# Patient Record
Sex: FEMALE | Race: WHITE | NOT HISPANIC OR LATINO | Employment: FULL TIME | URBAN - METROPOLITAN AREA
[De-identification: names, ages, dates, MRNs, and addresses within clinical notes are randomized per-mention and may not be internally consistent; named-entity substitution may affect disease eponyms.]

---

## 2017-09-09 DIAGNOSIS — J45.21: ICD-10-CM

## 2017-09-11 NOTE — TELEPHONE ENCOUNTER
PROAIR HFA ORAL INH (200 PFS) 8.5G         Last Written Prescription Date: 12/8/09  Last Fill Quantity: 1, # refills: 1    Last Office Visit with G, P or [facility] prescribing provider:  9/8/16    Discontinued medication: start 12/8/09 stop 3/16/11     Future Office Visit:       Date of Last Asthma Action Plan Letter:   Asthma Action Plan Q1 Year    Topic Date Due     Asthma Action Plan - yearly  02/15/2014      Asthma Control Test:   ACT Total Scores 9/8/2016   ACT TOTAL SCORE -   ASTHMA ER VISITS -   ASTHMA HOSPITALIZATIONS -   ACT TOTAL SCORE (Goal Greater than or Equal to 20) 22   In the past 12 months, how many times did you visit the emergency room for your asthma without being admitted to the hospital? 0   In the past 12 months, how many times were you hospitalized overnight because of your asthma? 0       Date of Last Spirometry Test:   No results found for this or any previous visit.

## 2017-09-12 RX ORDER — ALBUTEROL SULFATE 90 UG/1
AEROSOL, METERED RESPIRATORY (INHALATION)
Qty: 1 INHALER | Refills: 0 | Status: SHIPPED | OUTPATIENT
Start: 2017-09-12 | End: 2017-09-25

## 2017-09-12 NOTE — TELEPHONE ENCOUNTER
Prescription approved per [facility] Refill Protocol.    Nelsy Ortiz RN  [facility]

## 2017-09-12 NOTE — TELEPHONE ENCOUNTER
Left VM for pt to return call to clinic    She needs appt to be seen and an ACT. Last OV was 9/8/16 and mention was made of her albuterol in plan    Charo Flores RN   [facility]

## 2017-09-13 ASSESSMENT — ASTHMA QUESTIONNAIRES: ACT_TOTALSCORE: 21

## 2017-09-22 NOTE — PATIENT INSTRUCTIONS
Switch to flonase and zyrtec  Return to clinic for any new or worsening symptoms or go to ER Urgent care in off hours      Preventive Health Recommendations  Female Ages 40 to 49    Yearly exam:     See your health care provider every year in order to  1. Review health changes.   2. Discuss preventive care.    3. Review your medicines if your doctor prescribed any.      Get a Pap test every three years (unless you have an abnormal result and your provider advises testing more often).      If you get Pap tests with HPV test, you only need to test every 5 years, unless you have an abnormal result. You do not need a Pap test if your uterus was removed (hysterectomy) and you have not had cancer.      You should be tested each year for STDs (sexually transmitted diseases), if you're at risk.       Ask your doctor if you should have a mammogram.      Have a colonoscopy (test for colon cancer) if someone in your family has had colon cancer or polyps before age 50.       Have a cholesterol test every 5 years.       Have a diabetes test (fasting glucose) after age 45. If you are at risk for diabetes, you should have this test every 3 years.    Shots: Get a flu shot each year. Get a tetanus shot every 10 years.     Nutrition:     Eat at least 5 servings of fruits and vegetables each day.    Eat whole-grain bread, whole-wheat pasta and brown rice instead of white grains and rice.    Talk to your provider about Calcium and Vitamin D.     Lifestyle    Exercise at least 150 minutes a week (an average of 30 minutes a day, 5 days a week). This will help you control your weight and prevent disease.    Limit alcohol to one drink per day.    No smoking.     Wear sunscreen to prevent skin cancer.    See your dentist every six months for an exam and cleaning.

## 2017-09-22 NOTE — PROGRESS NOTES
SUBJECTIVE:   CC: Noemi Chi is an 47 year old woman who presents for preventive health visit.     Healthy Habits:    Do you get at least three servings of calcium containing foods daily (dairy, green leafy vegetables, etc.)? yes    Amount of exercise or daily activities, outside of work: 4 day(s) per week    Problems taking medications regularly No    Medication side effects: No    Have you had an eye exam in the past two years? yes    Do you see a dentist twice per year? yes    Do you have sleep apnea, excessive snoring or daytime drowsiness?no        Asthma Follow-Up    Was ACT completed today?  No just did on 9/9/17    Respiratory symptoms:   Cough: Yes   Wheezing: No   Shortness of breath: Rarely     Use of short- acting(rescue) inhaler: Yes once every few days    Taking controlled (daily) meds as prescribed: Yes    ER/UC visits or hospital admissions since last visit: not for asthma, went to  for poison ivy on face    Recent asthma triggers that patient is dealing with: fall time weather       Reports she can feel her heart beating all of the time. She does admit she has some congestion most of the time. And usually she can hear it in her left ear. No irregular heartbeat      Today's PHQ-2 Score:   PHQ-2 ( 1999 [employer]) 9/25/2017 9/8/2016   Q1: Little interest or pleasure in doing things 0 0   Q2: Feeling down, depressed or hopeless 0 0   PHQ-2 Score 0 0         Abuse: Current or Past(Physical, Sexual or Emotional)- No  Do you feel safe in your environment - Yes    Social History   Substance Use Topics     Smoking status: Former Smoker     Years: 1.00     Quit date: 3/31/2005     Smokeless tobacco: Never Used     Alcohol use Yes      Comment: 6/ week            Reviewed orders with patient.  Reviewed health maintenance and updated orders accordingly - Yes  Labs reviewed in EPIC  BP Readings from Last 3 Encounters:   09/25/17 116/60   09/08/16 106/58   03/10/15 105/69    Wt Readings from Last 3  "Encounters:   09/25/17 172 lb 11.2 oz (78.3 kg)   09/08/16 176 lb 1.6 oz (79.9 kg)   03/10/15 164 lb 3.2 oz (74.5 kg)                      Mammogram not appropriate for this patient based on age.    Pertinent mammograms are reviewed under the imaging tab.  History of abnormal Pap smear: NO - age 30-65 PAP every 5 years with negative HPV co-testing recommended    Reviewed and updated as needed this visit by clinical staff  Tobacco  Allergies  Meds         Reviewed and updated as needed this visit by Provider              ROS:  C: NEGATIVE for fever, chills, change in weight  I: NEGATIVE for worrisome rashes, moles or lesions  E: NEGATIVE for vision changes or irritation  ENT: NEGATIVE for ear, mouth and throat problems  R: NEGATIVE for significant cough or SOB  B: NEGATIVE for masses, tenderness or discharge  CV: NEGATIVE for chest pain, palpitations or peripheral edema  GI: NEGATIVE for nausea, abdominal pain, heartburn, or change in bowel habits  : NEGATIVE for unusual urinary or vaginal symptoms. Periods are regular.  M: NEGATIVE for significant arthralgias or myalgia  N: NEGATIVE for weakness, dizziness or paresthesias  P: NEGATIVE for changes in mood or affect    OBJECTIVE:   /60  Pulse 72  Temp 98.4  F (36.9  C) (Oral)  Ht 5' 8\" (1.727 m)  Wt 172 lb 11.2 oz (78.3 kg)  SpO2 96%  BMI 26.26 kg/m2  EXAM:  GENERAL: healthy, alert and no distress  EYES: Eyes grossly normal to inspection, PERRL and conjunctivae and sclerae normal  HENT: ear canals normal, left TM with air fluid levels, right TM normal, nose and mouth without ulcers or lesions  NECK: no adenopathy, no asymmetry, masses, or scars and thyroid normal to palpation  RESP: lungs clear to auscultation - no rales, rhonchi or wheezes  CV: regular rate and rhythm, normal S1 S2, no S3 or S4, no murmur, click or rub, no peripheral edema and peripheral pulses strong  ABDOMEN: soft, nontender, no hepatosplenomegaly, no masses and bowel sounds " normal  MS: no gross musculoskeletal defects noted, no edema  SKIN: no suspicious lesions or rashes  NEURO: Normal strength and tone, mentation intact and speech normal  PSYCH: mentation appears normal, affect normal/bright    ASSESSMENT/PLAN:       ICD-10-CM    1. Encounter for routine adult medical exam with abnormal findings Z00.01    2. Intrinsic asthma with acute exacerbation, mild intermittent J45.21 albuterol (PROAIR HFA) 108 (90 BASE) MCG/ACT Inhaler   3. Dysfunction of eustachian tube, left H69.82      Patient Instructions   Switch to flonase and zyrtec  Return to clinic for any new or worsening symptoms or go to ER Urgent care in off hours      Preventive Health Recommendations  Female Ages 40 to 49    Yearly exam:     See your health care provider every year in order to  1. Review health changes.   2. Discuss preventive care.    3. Review your medicines if your doctor prescribed any.      Get a Pap test every three years (unless you have an abnormal result and your provider advises testing more often).      If you get Pap tests with HPV test, you only need to test every 5 years, unless you have an abnormal result. You do not need a Pap test if your uterus was removed (hysterectomy) and you have not had cancer.      You should be tested each year for STDs (sexually transmitted diseases), if you're at risk.       Ask your doctor if you should have a mammogram.      Have a colonoscopy (test for colon cancer) if someone in your family has had colon cancer or polyps before age 50.       Have a cholesterol test every 5 years.       Have a diabetes test (fasting glucose) after age 45. If you are at risk for diabetes, you should have this test every 3 years.    Shots: Get a flu shot each year. Get a tetanus shot every 10 years.     Nutrition:     Eat at least 5 servings of fruits and vegetables each day.    Eat whole-grain bread, whole-wheat pasta and brown rice instead of white grains and rice.    Talk to your  "provider about Calcium and Vitamin D.     Lifestyle    Exercise at least 150 minutes a week (an average of 30 minutes a day, 5 days a week). This will help you control your weight and prevent disease.    Limit alcohol to one drink per day.    No smoking.     Wear sunscreen to prevent skin cancer.    See your dentist every six months for an exam and cleaning.      COUNSELING:   Reviewed preventive health counseling, as reflected in patient instructions         reports that she quit smoking about 12 years ago. She quit after 1.00 year of use. She has never used smokeless tobacco.    Estimated body mass index is 26.26 kg/(m^2) as calculated from the following:    Height as of this encounter: 5' 8\" (1.727 m).    Weight as of this encounter: 172 lb 11.2 oz (78.3 kg).       Counseling Resources:  ATP IV Guidelines  Pooled Cohorts Equation Calculator  Breast Cancer Risk Calculator  FRAX Risk Assessment  ICSI Preventive Guidelines  Dietary Guidelines for Americans, 2010  USDA's MyPlate  ASA Prophylaxis  Lung CA Screening    Gianna Cabello PA-C  [facility]  "

## 2017-09-25 ENCOUNTER — OFFICE VISIT (OUTPATIENT)
Dept: FAMILY MEDICINE | Facility: CLINIC | Age: 47
End: 2017-09-25
Payer: COMMERCIAL

## 2017-09-25 VITALS
DIASTOLIC BLOOD PRESSURE: 60 MMHG | WEIGHT: 172.7 LBS | TEMPERATURE: 98.4 F | HEART RATE: 72 BPM | HEIGHT: 68 IN | BODY MASS INDEX: 26.18 KG/M2 | OXYGEN SATURATION: 96 % | SYSTOLIC BLOOD PRESSURE: 116 MMHG

## 2017-09-25 DIAGNOSIS — H69.92 DYSFUNCTION OF EUSTACHIAN TUBE, LEFT: ICD-10-CM

## 2017-09-25 DIAGNOSIS — J45.21: ICD-10-CM

## 2017-09-25 DIAGNOSIS — Z00.01 ENCOUNTER FOR ROUTINE ADULT MEDICAL EXAM WITH ABNORMAL FINDINGS: Primary | ICD-10-CM

## 2017-09-25 PROCEDURE — 99396 PREV VISIT EST AGE 40-64: CPT | Performed by: PHYSICIAN ASSISTANT

## 2017-09-25 RX ORDER — ALBUTEROL SULFATE 90 UG/1
2 AEROSOL, METERED RESPIRATORY (INHALATION) EVERY 4 HOURS PRN
Qty: 2 INHALER | Refills: 3 | Status: SHIPPED | OUTPATIENT
Start: 2017-09-25 | End: 2019-01-07

## 2017-09-25 NOTE — NURSING NOTE
"Chief Complaint   Patient presents with     Physical       Initial /60  Pulse 72  Temp 98.4  F (36.9  C) (Oral)  Ht 5' 8\" (1.727 m)  Wt 172 lb 11.2 oz (78.3 kg)  SpO2 96%  BMI 26.26 kg/m2 Estimated body mass index is 26.26 kg/(m^2) as calculated from the following:    Height as of this encounter: 5' 8\" (1.727 m).    Weight as of this encounter: 172 lb 11.2 oz (78.3 kg).    Vida Linton MA      "

## 2017-09-25 NOTE — LETTER
My Asthma Action Plan  Name: Noemi Chi   YOB: 1970  Date: 9/22/2017   My doctor: Gianna Cabello PA-C   My clinic: [facility]        My Control Medicine: None  My Rescue Medicine: Albuterol (Proair/Ventolin/Proventil) inhaler 1-2 puffs by mouth every 4-6 hours   My Asthma Severity: intermittent  Avoid your asthma triggers: triggers               GREEN ZONE   Good Control    I feel good    No cough or wheeze    Can work, sleep and play without asthma symptoms       Take your asthma control medicine every day.     1. If exercise triggers your asthma, take your rescue medication    15 minutes before exercise or sports, and    During exercise if you have asthma symptoms  2. Spacer to use with inhaler: If you have a spacer, make sure to use it with your inhaler             YELLOW ZONE Getting Worse  I have ANY of these:    I do not feel good    Cough or wheeze    Chest feels tight    Wake up at night   1. Keep taking your Green Zone medications  2. Start taking your rescue medicine:    every 20 minutes for up to 1 hour. Then every 4 hours for 24-48 hours.  3. If you stay in the Yellow Zone for more than 12-24 hours, contact your doctor.  4. If you do not return to the Green Zone in 12-24 hours or you get worse, start taking your oral steroid medicine if prescribed by your provider.           RED ZONE Medical Alert - Get Help  I have ANY of these:    I feel awful    Medicine is not helping    Breathing getting harder    Trouble walking or talking    Nose opens wide to breathe       1. Take your rescue medicine NOW  2. If your provider has prescribed an oral steroid medicine, start taking it NOW  3. Call your doctor NOW  4. If you are still in the Red Zone after 20 minutes and you have not reached your doctor:    Take your rescue medicine again and    Call 911 or go to the emergency room right away    See your regular doctor within 2 weeks of an Emergency Room or Urgent  Care visit for follow-up treatment.        Electronically signed by: Vida Linton, September 22, 2017    Annual Reminders:  Meet with Asthma Educator,  Flu Shot in the Fall, consider Pneumonia Vaccination for patients with asthma (aged 19 and older).    [facility] [id number] [address] AT [address]                    Asthma Triggers  How To Control Things That Make Your Asthma Worse    Triggers are things that make your asthma worse.  Look at the list below to help you find your triggers and what you can do about them.  You can help prevent asthma flare-ups by staying away from your triggers.      Trigger                                                          What you can do   Cigarette Smoke  Tobacco smoke can make asthma worse. Do not allow smoking in your home, car or around you.  Be sure no one smokes at a child s day care or school.  If you smoke, ask your health care provider for ways to help you quit.  Ask family members to quit too.  Ask your health care provider for a referral to Quit Plan to help you quit smoking, or call 9-036-635-PLAN.     Colds, Flu, Bronchitis  These are common triggers of asthma. Wash your hands often.  Don t touch your eyes, nose or mouth.  Get a flu shot every year.     Dust Mites  These are tiny bugs that live in cloth or carpet. They are too small to see. Wash sheets and blankets in hot water every week.   Encase pillows and mattress in dust mite proof covers.  Avoid having carpet if you can. If you have carpet, vacuum weekly.   Use a dust mask and HEPA vacuum.   Pollen and Outdoor Mold  Some people are allergic to trees, grass, or weed pollen, or molds. Try to keep your windows closed.  Limit time out doors when pollen count is high.   Ask you health care provider about taking medicine during allergy season.     Animal Dander  Some people are allergic to skin flakes, urine or saliva from pets with fur or feathers. Keep pets with  fur or feathers out of your home.    If you can t keep the pet outdoors, then keep the pet out of your bedroom.  Keep the bedroom door closed.  Keep pets off cloth furniture and away from stuffed toys.     Mice, Rats, and Cockroaches  Some people are allergic to the waste from these pests.   Cover food and garbage.  Clean up spills and food crumbs.  Store grease in the refrigerator.   Keep food out of the bedroom.   Indoor Mold  This can be a trigger if your home has high moisture. Fix leaking faucets, pipes, or other sources of water.   Clean moldy surfaces.  Dehumidify basement if it is damp and smelly.   Smoke, Strong Odors, and Sprays  These can reduce air quality. Stay away from strong odors and sprays, such as perfume, powder, hair spray, paints, smoke incense, paint, cleaning products, candles and new carpet.   Exercise or Sports  Some people with asthma have this trigger. Be active!  Ask your doctor about taking medicine before sports or exercise to prevent symptoms.    Warm up for 5-10 minutes before and after sports or exercise.     Other Triggers of Asthma  Cold air:  Cover your nose and mouth with a scarf.  Sometimes laughing or crying can be a trigger.  Some medicines and food can trigger asthma.

## 2017-09-25 NOTE — MR AVS SNAPSHOT
After Visit Summary   9/25/2017    Noemi Chi    MRN: [id number]           Patient Information     Date Of Birth          1970        Visit Information        Provider Department      9/25/2017 8:40 AM Gianna Cabello PA-C [facility]        Today's Diagnoses     Mild intermittent asthma    -  1    Routine general medical examination at a health care facility        Encounter for routine adult medical exam with abnormal findings        Intrinsic asthma with acute exacerbation, mild intermittent          Care Instructions    Switch to flonase and zyrtec  Return to clinic for any new or worsening symptoms or go to ER Urgent care in off hours      Preventive Health Recommendations  Female Ages 40 to 49    Yearly exam:     See your health care provider every year in order to  1. Review health changes.   2. Discuss preventive care.    3. Review your medicines if your doctor prescribed any.      Get a Pap test every three years (unless you have an abnormal result and your provider advises testing more often).      If you get Pap tests with HPV test, you only need to test every 5 years, unless you have an abnormal result. You do not need a Pap test if your uterus was removed (hysterectomy) and you have not had cancer.      You should be tested each year for STDs (sexually transmitted diseases), if you're at risk.       Ask your doctor if you should have a mammogram.      Have a colonoscopy (test for colon cancer) if someone in your family has had colon cancer or polyps before age 50.       Have a cholesterol test every 5 years.       Have a diabetes test (fasting glucose) after age 45. If you are at risk for diabetes, you should have this test every 3 years.    Shots: Get a flu shot each year. Get a tetanus shot every 10 years.     Nutrition:     Eat at least 5 servings of fruits and vegetables each day.    Eat whole-grain bread, whole-wheat pasta and brown rice instead  "of white grains and rice.    Talk to your provider about Calcium and Vitamin D.     Lifestyle    Exercise at least 150 minutes a week (an average of 30 minutes a day, 5 days a week). This will help you control your weight and prevent disease.    Limit alcohol to one drink per day.    No smoking.     Wear sunscreen to prevent skin cancer.    See your dentist every six months for an exam and cleaning.          Follow-ups after your visit        Who to contact     If you have questions or need follow up information about today's clinic visit or your schedule please contact [facility] directly at [phone].  Normal or non-critical lab and imaging results will be communicated to you by [employer], letter or phone within 4 business days after the clinic has received the results. If you do not hear from us within 7 days, please contact the clinic through [employer] or phone. If you have a critical or abnormal lab result, we will notify you by phone as soon as possible.  Submit refill requests through [employer] or call your pharmacy and they will forward the refill request to us. Please allow 3 business days for your refill to be completed.          Additional Information About Your Visit        MyChart Information     [employer] gives you secure access to your electronic health record. If you see a primary care provider, you can also send messages to your care team and make appointments. If you have questions, please call your primary care clinic.  If you do not have a primary care provider, please call [phone] and they will assist you.        Care EveryWhere ID     This is your Care EveryWhere ID. This could be used by other organizations to access your [facility] medical records  [id number]        Your Vitals Were     Pulse Temperature Height Pulse Oximetry BMI (Body Mass Index)       72 98.4  F (36.9  C) (Oral) 5' 8\" (1.727 m) 96% 26.26 kg/m2        Blood Pressure from Last 3 Encounters:   09/25/17 116/60 "   09/08/16 106/58   03/10/15 105/69    Weight from Last 3 Encounters:   09/25/17 172 lb 11.2 oz (78.3 kg)   09/08/16 176 lb 1.6 oz (79.9 kg)   03/10/15 164 lb 3.2 oz (74.5 kg)              We Performed the Following     Asthma Action Plan (AAP)          Today's Medication Changes          These changes are accurate as of: 9/25/17  8:51 AM.  If you have any questions, ask your nurse or doctor.               These medicines have changed or have updated prescriptions.        Dose/Directions    albuterol 108 (90 BASE) MCG/ACT Inhaler   Commonly known as:  PROAIR HFA   This may have changed:  See the new instructions.   Used for:  Intrinsic asthma with acute exacerbation, mild intermittent   Changed by:  Gianna Cabello PA-C        Dose:  2 puff   Inhale 2 puffs into the lungs every 4 hours as needed for shortness of breath / dyspnea or wheezing   Quantity:  2 Inhaler   Refills:  3            Where to get your medicines      These medications were sent to [facility] [address] [place] [address] AT [address] [address]     Phone:  [phone]     albuterol 108 (90 BASE) MCG/ACT Inhaler                Primary Care Provider Office Phone # Fax #    [facility] [place] [phone] [address] [address]        Equal Access to Services     VASILIY CHAPMAN : Hadii orlin goddardo Soeleazar, waaxda luqadaha, qaybta kaalmada adeegyada, paulina martin. So [facility] [phone].    ATENCIÓN: Si habla español, tiene a ha disposición servicios gratuitos de asistencia lingüística. Llame al [phone].    We comply with applicable federal civil rights laws and [place] laws. We do not discriminate on the basis of race, color, national origin, age, disability sex, sexual orientation or gender identity.            Thank you!     Thank you for choosing [facility]  for your care.  Our goal is always to provide you with excellent care. Hearing back from our patients is one way we can continue to improve our services. Please take a few minutes to complete the written survey that you may receive in the mail after your visit with us. Thank you!             Your Updated Medication List - Protect others around you: Learn how to safely use, store and throw away your medicines at www.disposemymeds.org.          This list is accurate as of: 9/25/17  8:51 AM.  Always use your most recent med list.                   Brand Name Dispense Instructions for use Diagnosis    albuterol 108 (90 BASE) MCG/ACT Inhaler    PROAIR HFA    2 Inhaler    Inhale 2 puffs into the lungs every 4 hours as needed for shortness of breath / dyspnea or wheezing    Intrinsic asthma with acute exacerbation, mild intermittent       CLARITIN 10 MG capsule   Generic drug:  loratadine      Take 10 mg by mouth as needed.    Chronic rhinitis       MULTIVITAMINS PO      Take  by mouth.    Intrinsic asthma with acute exacerbation

## 2019-01-06 ASSESSMENT — ENCOUNTER SYMPTOMS
ARTHRALGIAS: 1
HEMATOCHEZIA: 0
FEVER: 0
HEMATURIA: 0
BREAST MASS: 0
EYE PAIN: 0
COUGH: 0
WEAKNESS: 1
HEADACHES: 1
ABDOMINAL PAIN: 0
SORE THROAT: 0
NERVOUS/ANXIOUS: 1
PALPITATIONS: 1
CONSTIPATION: 0
DIZZINESS: 0
DIARRHEA: 0
PARESTHESIAS: 0
DYSURIA: 0
FREQUENCY: 0
NAUSEA: 0
SHORTNESS OF BREATH: 0
MYALGIAS: 1
JOINT SWELLING: 0
CHILLS: 0
HEARTBURN: 0

## 2019-01-07 ENCOUNTER — OFFICE VISIT (OUTPATIENT)
Dept: FAMILY MEDICINE | Facility: CLINIC | Age: 49
End: 2019-01-07
Payer: COMMERCIAL

## 2019-01-07 VITALS
HEART RATE: 66 BPM | WEIGHT: 181.5 LBS | BODY MASS INDEX: 27.51 KG/M2 | HEIGHT: 68 IN | DIASTOLIC BLOOD PRESSURE: 71 MMHG | OXYGEN SATURATION: 95 % | TEMPERATURE: 98 F | SYSTOLIC BLOOD PRESSURE: 109 MMHG

## 2019-01-07 DIAGNOSIS — Z11.4 SCREENING FOR HIV (HUMAN IMMUNODEFICIENCY VIRUS): ICD-10-CM

## 2019-01-07 DIAGNOSIS — Z00.00 ROUTINE GENERAL MEDICAL EXAMINATION AT A HEALTH CARE FACILITY: Primary | ICD-10-CM

## 2019-01-07 DIAGNOSIS — J45.20 MILD INTERMITTENT ASTHMA, UNSPECIFIED WHETHER COMPLICATED: ICD-10-CM

## 2019-01-07 DIAGNOSIS — M77.11 RIGHT TENNIS ELBOW: ICD-10-CM

## 2019-01-07 DIAGNOSIS — J45.21: ICD-10-CM

## 2019-01-07 DIAGNOSIS — Z13.220 SCREENING CHOLESTEROL LEVEL: ICD-10-CM

## 2019-01-07 DIAGNOSIS — M77.8 LEFT ELBOW TENDONITIS: ICD-10-CM

## 2019-01-07 LAB
ALBUMIN SERPL-MCNC: 3.7 G/DL (ref 3.4–5)
ALP SERPL-CCNC: 71 U/L (ref 40–150)
ALT SERPL W P-5'-P-CCNC: 22 U/L (ref 0–50)
ANION GAP SERPL CALCULATED.3IONS-SCNC: 7 MMOL/L (ref 3–14)
AST SERPL W P-5'-P-CCNC: 14 U/L (ref 0–45)
BILIRUB SERPL-MCNC: 0.4 MG/DL (ref 0.2–1.3)
BUN SERPL-MCNC: 13 MG/DL (ref 7–30)
CALCIUM SERPL-MCNC: 8.7 MG/DL (ref 8.5–10.1)
CHLORIDE SERPL-SCNC: 107 MMOL/L (ref 94–109)
CHOLEST SERPL-MCNC: 216 MG/DL
CO2 SERPL-SCNC: 25 MMOL/L (ref 20–32)
CREAT SERPL-MCNC: 0.67 MG/DL (ref 0.52–1.04)
GFR SERPL CREATININE-BSD FRML MDRD: >90 ML/MIN/{1.73_M2}
GLUCOSE SERPL-MCNC: 98 MG/DL (ref 70–99)
HDLC SERPL-MCNC: 78 MG/DL
LDLC SERPL CALC-MCNC: 123 MG/DL
NONHDLC SERPL-MCNC: 138 MG/DL
POTASSIUM SERPL-SCNC: 4.4 MMOL/L (ref 3.4–5.3)
PROT SERPL-MCNC: 6.9 G/DL (ref 6.8–8.8)
SODIUM SERPL-SCNC: 139 MMOL/L (ref 133–144)
TRIGL SERPL-MCNC: 77 MG/DL

## 2019-01-07 PROCEDURE — 99213 OFFICE O/P EST LOW 20 MIN: CPT | Mod: 25 | Performed by: NURSE PRACTITIONER

## 2019-01-07 PROCEDURE — 36415 COLL VENOUS BLD VENIPUNCTURE: CPT | Performed by: NURSE PRACTITIONER

## 2019-01-07 PROCEDURE — 99396 PREV VISIT EST AGE 40-64: CPT | Performed by: NURSE PRACTITIONER

## 2019-01-07 PROCEDURE — 80053 COMPREHEN METABOLIC PANEL: CPT | Performed by: NURSE PRACTITIONER

## 2019-01-07 PROCEDURE — 80061 LIPID PANEL: CPT | Performed by: NURSE PRACTITIONER

## 2019-01-07 RX ORDER — ALBUTEROL SULFATE 90 UG/1
2 AEROSOL, METERED RESPIRATORY (INHALATION) EVERY 4 HOURS PRN
Qty: 2 INHALER | Refills: 3 | Status: SHIPPED | OUTPATIENT
Start: 2019-01-07 | End: 2020-01-07

## 2019-01-07 ASSESSMENT — ENCOUNTER SYMPTOMS
SHORTNESS OF BREATH: 0
CHILLS: 0
FEVER: 0
PALPITATIONS: 1
COUGH: 0
WEAKNESS: 1
HEADACHES: 1
ABDOMINAL PAIN: 0
DIARRHEA: 0
JOINT SWELLING: 0
MYALGIAS: 1
PARESTHESIAS: 0
SORE THROAT: 0
DYSURIA: 0
BREAST MASS: 0
CONSTIPATION: 0
HEARTBURN: 0
FREQUENCY: 0
HEMATOCHEZIA: 0
ARTHRALGIAS: 1
EYE PAIN: 0
NERVOUS/ANXIOUS: 1
NAUSEA: 0
HEMATURIA: 0
DIZZINESS: 0

## 2019-01-07 ASSESSMENT — MIFFLIN-ST. JEOR: SCORE: 1510.75

## 2019-01-09 ASSESSMENT — ASTHMA QUESTIONNAIRES: ACT_TOTALSCORE: 23

## 2019-01-09 NOTE — PROGRESS NOTES
[facility]   Clinic Visit s Jan 14, 2019    PCP: Maye Franklin Xiomy Franklin is a 48 year old female who is seen in consultation at the request of Dr. Bennett for   Chief Complaint   Patient presents with     Left Elbow - Pain     Right Elbow - Pain       Injury: Reports insidious onset without acute precipitating event. The patient does do yoga and cross country skiing which may have contributed.      Right hand dominant    Location of Pain: right elbow lateral, radiating to hand   Duration of Pain: 3 week(s)  Rating of Pain at worst: 7/10  Rating of Pain Currently: 7/10  Pain is better with: activity avoidance   Pain is worse with: carrying (trash, groceries, laundry), gripping and self care  Treatment so far consists of: massage and ibuprofen  Associated symptoms: no distal numbness or tingling; denies swelling or warmth  Recent imaging completed: No recent imaging completed.  Prior History of related problems: none    Had ergonomic assessment at work. Does some computer work at home.     Location of Pain: right shoulder, superior    Duration of Pain: 3 month(s)  Rating of Pain at worst: 3/10  Rating of Pain Currently: 3/10  Pain is better with: activity avoidance   Pain is worse with: overhead motion, lifting, swimming  Treatment so far consists of: massage and ibuprofen  Associated symptoms: popping sensations, no distal numbness or tingling; denies swelling or warmth  Recent imaging completed: No recent imaging completed.  Prior History of related problems: similar issues many years ago, went away with activity avoidance   Not much pain now after backing off of yoga, popping continues, would like to get back to yoga and swimming.    Social History: is employed as a/an property management      Past Medical History:   Diagnosis Date     Mild intermittent asthma     EIA     Varicella without mention of complication        Patient Active Problem List    Diagnosis Date Noted  "    Chronic rhinitis 2011     Priority: Medium     CARDIOVASCULAR SCREENING; LDL GOAL LESS THAN 160 10/31/2010     Priority: Medium     Miscarriage 2009     Priority: Medium     Mild intermittent asthma 2005     Priority: Medium     EIA         Family History   Problem Relation Age of Onset     Hypertension Maternal Grandmother      Hypertension Maternal Grandfather      Hypertension Mother      C.A.D. Maternal Grandmother         anureysm/ ruptured aortic     C.A.D. Father         bypass surgery     Gynecology Mother      Heart Disease Father      Lipids Mother        Social History     Socioeconomic History     Marital status: Single     Spouse name: Not on file     Number of children: 0     Years of education: 16     Highest education level: Not on file   Social Needs     Financial resource strain: Not on file     Food insecurity - worry: Not on file     Food insecurity - inability: Not on file     Transportation needs - medical: Not on file     Transportation needs - non-medical: Not on file   Occupational History     Occupation: property manger     Employer: [employer]     Comment: [employer]   Tobacco Use     Smoking status: Former Smoker     Years: 1.00     Last attempt to quit: 3/31/2005     Years since quittin.7     Smokeless tobacco: Never Used   Substance and Sexual Activity     Alcohol use: Yes     Comment: 6/ week     Drug use: No       Past Surgical History:   Procedure Laterality Date     HC TOOTH EXTRACTION W/FORCEP      [place] teeth           Review of Systems   Musculoskeletal: Positive for joint pain. Negative for neck pain.   All other systems reviewed and are negative.        Physical Exam  /77   Ht 1.734 m (5' 8.25\")   Wt 82.1 kg (181 lb)   LMP 2016 (Approximate)   BMI 27.32 kg/m    Constitutional:well-developed, well-nourished, and in no distress.   Cardiovascular: Intact distal pulses.    Neurological: alert. Gait Normal:   " Gait, station, stance, and balance appear normal for age  Skin: Skin is warm and dry.   Psychiatric: Mood and affect normal.   Respiratory: unlabored, speaks in full sentences  Lymph: no LAD, no lymphangitis      Right Elbow Exam     Tenderness   The patient is experiencing tenderness in the lateral epicondyle.     Range of Motion   Extension: normal   Flexion: normal   Pronation: normal   Supination: normal     Muscle Strength   The patient has normal right elbow strength.    Tests   Varus: negative  Valgus: negative    Other   Erythema: absent  Scars: absent  Sensation: normal  Pulse: present    Comments:  Posterior pain at full extension.  Pain with  and resisted wrist extension, full strength      Left Elbow Exam     Tenderness   The patient is experiencing tenderness in the lateral epicondyle.     Range of Motion   Extension: normal   Flexion: normal   Pronation: normal   Supination: normal     Muscle Strength   The patient has normal left elbow strength.    Tests   Varus: negative  Valgus: negative    Other   Erythema: absent  Scars: absent  Sensation: normal  Pulse: present    Comments:  Pain with  and resisted wrist extension, full strength      Right Shoulder Exam     Tenderness   The patient is experiencing tenderness in the acromioclavicular joint.    Range of Motion   Active abduction: normal   Passive abduction: normal   Extension: normal   External rotation: normal   Forward flexion: normal   Internal rotation 0 degrees: normal   Internal rotation 90 degrees: normal     Muscle Strength   Abduction: 5/5   Internal rotation: 5/5   External rotation: 5/5   Supraspinatus: 5/5   Subscapularis: 5/5   Biceps: 5/5     Tests   Apprehension: negative  Jade test: negative  Cross arm: positive  Impingement: positive  Drop arm: negative  Sulcus: absent    Other   Erythema: absent  Scars: absent  Sensation: none  Pulse: present          ASSESSMENT/PLAN    ICD-10-CM    1. Injury of right acromioclavicular  "joint, initial encounter S49.91[facility], HAND, AND CHIROPRACTIC REFERRAL   2. Lateral epicondylitis of both elbows M77.11     M77.12      Nature of injury discussed noting degenerative nature of these injuries as opposed to inflammatory conditions.  Treatment options reviewed including continued rest, therapy either home program or formal hand therapy referral, and injection of cortisone or irritant therapy protocols.    Noted that PRP and autologous blood injections are considered investigational and not covered by health plans. NG patches can be helpful but may take several months to be effective. Cortisone injections are commonly done, and can be effective though are at risk for relapse, but pain control often valuable to many patients.     Since left tennis elbow is getting better on its own, she opts to proceed with home exercises for her right elbow.  Declines hand therapy referral or cortisone steroid injection . Instead opts for home rehab program. Instructed in eccentric exercises, a \"longer and stronger\" concept, which is useful in building a stronger and more functional muscle and tendon unit. This can be performed with free weights, a peggy twist device, or Therabands. Individualized rehab program designed for patient during visit.     Right shoulder is primarily symptomatic at the AC joint.  X-rays discussed but as this would not affect treatment options, we opted to defer.  Instead recommended physical therapy for rotator cuff strengthening and shoulder stabilization.  Cold packs and avoidance of heavy straps across her shoulder on the right side recommended.  Follow-up as needed.  "

## 2019-01-14 ENCOUNTER — OFFICE VISIT (OUTPATIENT)
Dept: ORTHOPEDICS | Facility: CLINIC | Age: 49
End: 2019-01-14
Payer: COMMERCIAL

## 2019-01-14 VITALS
DIASTOLIC BLOOD PRESSURE: 77 MMHG | SYSTOLIC BLOOD PRESSURE: 115 MMHG | HEIGHT: 68 IN | BODY MASS INDEX: 27.43 KG/M2 | WEIGHT: 181 LBS

## 2019-01-14 DIAGNOSIS — S49.91XA INJURY OF RIGHT ACROMIOCLAVICULAR JOINT, INITIAL ENCOUNTER: Primary | ICD-10-CM

## 2019-01-14 DIAGNOSIS — M77.11 LATERAL EPICONDYLITIS OF BOTH ELBOWS: ICD-10-CM

## 2019-01-14 DIAGNOSIS — M77.12 LATERAL EPICONDYLITIS OF BOTH ELBOWS: ICD-10-CM

## 2019-01-14 PROCEDURE — 99244 OFF/OP CNSLTJ NEW/EST MOD 40: CPT | Performed by: FAMILY MEDICINE

## 2019-01-14 ASSESSMENT — ENCOUNTER SYMPTOMS: NECK PAIN: 0

## 2019-01-14 ASSESSMENT — MIFFLIN-ST. JEOR: SCORE: 1508.48

## 2019-01-14 NOTE — LETTER
1/14/2019         RE: Noemi Chi  [address]  [place] [address]        Dear Colleague,    Thank you for referring your patient, Noemi Chi, to the  SPORTS MEDICINE. Please see a copy of my visit note below.    [facility]   Clinic Visit s Jan 14, 2019    PCP: Maye Franklin Xiomy Franklin is a 48 year old female who is seen in consultation at the request of Dr. Bennett for   Chief Complaint   Patient presents with     Left Elbow - Pain     Right Elbow - Pain       Injury: Reports insidious onset without acute precipitating event. The patient does do yoga and cross country skiing which may have contributed.      Right hand dominant    Location of Pain: right elbow lateral, radiating to hand   Duration of Pain: 3 week(s)  Rating of Pain at worst: 7/10  Rating of Pain Currently: 7/10  Pain is better with: activity avoidance   Pain is worse with: carrying (trash, groceries, laundry), gripping and self care  Treatment so far consists of: massage and ibuprofen  Associated symptoms: no distal numbness or tingling; denies swelling or warmth  Recent imaging completed: No recent imaging completed.  Prior History of related problems: none    Had ergonomic assessment at work. Does some computer work at home.     Location of Pain: right shoulder, superior    Duration of Pain: 3 month(s)  Rating of Pain at worst: 3/10  Rating of Pain Currently: 3/10  Pain is better with: activity avoidance   Pain is worse with: overhead motion, lifting, swimming  Treatment so far consists of: massage and ibuprofen  Associated symptoms: popping sensations, no distal numbness or tingling; denies swelling or warmth  Recent imaging completed: No recent imaging completed.  Prior History of related problems: similar issues many years ago, went away with activity avoidance   Not much pain now after backing off of yoga, popping continues, would like to get back to yoga and  swimming.    Social History: is employed as a/an property management      Past Medical History:   Diagnosis Date     Mild intermittent asthma     EIA     Varicella without mention of complication        Patient Active Problem List    Diagnosis Date Noted     Chronic rhinitis 2011     Priority: Medium     CARDIOVASCULAR SCREENING; LDL GOAL LESS THAN 160 10/31/2010     Priority: Medium     Miscarriage 2009     Priority: Medium     Mild intermittent asthma 2005     Priority: Medium     EIA         Family History   Problem Relation Age of Onset     Hypertension Maternal Grandmother      Hypertension Maternal Grandfather      Hypertension Mother      C.A.D. Maternal Grandmother         anureysm/ ruptured aortic     C.A.D. Father         bypass surgery     Gynecology Mother      Heart Disease Father      Lipids Mother        Social History     Socioeconomic History     Marital status: Single     Spouse name: Not on file     Number of children: 0     Years of education: 16     Highest education level: Not on file   Social Needs     Financial resource strain: Not on file     Food insecurity - worry: Not on file     Food insecurity - inability: Not on file     Transportation needs - medical: Not on file     Transportation needs - non-medical: Not on file   Occupational History     Occupation: property manger     Employer: [employer]     Comment: [employer]   Tobacco Use     Smoking status: Former Smoker     Years: 1.00     Last attempt to quit: 3/31/2005     Years since quittin.7     Smokeless tobacco: Never Used   Substance and Sexual Activity     Alcohol use: Yes     Comment: 6/ week     Drug use: No       Past Surgical History:   Procedure Laterality Date     HC TOOTH EXTRACTION W/FORCEP      [place] teeth           Review of Systems   Musculoskeletal: Positive for joint pain. Negative for neck pain.   All other systems reviewed and are negative.        Physical  "Exam  /77   Ht 1.734 m (5' 8.25\")   Wt 82.1 kg (181 lb)   LMP 08/08/2016 (Approximate)   BMI 27.32 kg/m     Constitutional:well-developed, well-nourished, and in no distress.   Cardiovascular: Intact distal pulses.    Neurological: alert. Gait Normal:   Gait, station, stance, and balance appear normal for age  Skin: Skin is warm and dry.   Psychiatric: Mood and affect normal.   Respiratory: unlabored, speaks in full sentences  Lymph: no LAD, no lymphangitis      Right Elbow Exam     Tenderness   The patient is experiencing tenderness in the lateral epicondyle.     Range of Motion   Extension: normal   Flexion: normal   Pronation: normal   Supination: normal     Muscle Strength   The patient has normal right elbow strength.    Tests   Varus: negative  Valgus: negative    Other   Erythema: absent  Scars: absent  Sensation: normal  Pulse: present    Comments:  Posterior pain at full extension.  Pain with  and resisted wrist extension, full strength      Left Elbow Exam     Tenderness   The patient is experiencing tenderness in the lateral epicondyle.     Range of Motion   Extension: normal   Flexion: normal   Pronation: normal   Supination: normal     Muscle Strength   The patient has normal left elbow strength.    Tests   Varus: negative  Valgus: negative    Other   Erythema: absent  Scars: absent  Sensation: normal  Pulse: present    Comments:  Pain with  and resisted wrist extension, full strength      Right Shoulder Exam     Tenderness   The patient is experiencing tenderness in the acromioclavicular joint.    Range of Motion   Active abduction: normal   Passive abduction: normal   Extension: normal   External rotation: normal   Forward flexion: normal   Internal rotation 0 degrees: normal   Internal rotation 90 degrees: normal     Muscle Strength   Abduction: 5/5   Internal rotation: 5/5   External rotation: 5/5   Supraspinatus: 5/5   Subscapularis: 5/5   Biceps: 5/5     Tests   Apprehension: " "negative  Jade test: negative  Cross arm: positive  Impingement: positive  Drop arm: negative  Sulcus: absent    Other   Erythema: absent  Scars: absent  Sensation: none  Pulse: present          ASSESSMENT/PLAN    ICD-10-CM    1. Injury of right acromioclavicular joint, initial encounter S49.91[facility], HAND, AND CHIROPRACTIC REFERRAL   2. Lateral epicondylitis of both elbows M77.11     M77.12      Nature of injury discussed noting degenerative nature of these injuries as opposed to inflammatory conditions.  Treatment options reviewed including continued rest, therapy either home program or formal hand therapy referral, and injection of cortisone or irritant therapy protocols.    Noted that PRP and autologous blood injections are considered investigational and not covered by health plans. NG patches can be helpful but may take several months to be effective. Cortisone injections are commonly done, and can be effective though are at risk for relapse, but pain control often valuable to many patients.     Since left tennis elbow is getting better on its own, she opts to proceed with home exercises for her right elbow.  Declines hand therapy referral or cortisone steroid injection . Instead opts for home rehab program. Instructed in eccentric exercises, a \"longer and stronger\" concept, which is useful in building a stronger and more functional muscle and tendon unit. This can be performed with free weights, a peggy twist device, or Therabands. Individualized rehab program designed for patient during visit.     Right shoulder is primarily symptomatic at the AC joint.  X-rays discussed but as this would not affect treatment options, we opted to defer.  Instead recommended physical therapy for rotator cuff strengthening and shoulder stabilization.  Cold packs and avoidance of heavy straps across her shoulder on the right side recommended.  Follow-up as needed.    Again, thank you for allowing me to participate in the " care of your patient.        Sincerely,        David Barrett MD

## 2019-02-11 ENCOUNTER — THERAPY VISIT (OUTPATIENT)
Dept: PHYSICAL THERAPY | Facility: CLINIC | Age: 49
End: 2019-02-11
Payer: COMMERCIAL

## 2019-02-11 DIAGNOSIS — M25.511 ACUTE PAIN OF RIGHT SHOULDER: ICD-10-CM

## 2019-02-11 DIAGNOSIS — S49.91XA INJURY OF RIGHT ACROMIOCLAVICULAR JOINT, INITIAL ENCOUNTER: ICD-10-CM

## 2019-02-11 PROCEDURE — 97112 NEUROMUSCULAR REEDUCATION: CPT | Mod: GP | Performed by: PHYSICAL THERAPIST

## 2019-02-11 PROCEDURE — 97161 PT EVAL LOW COMPLEX 20 MIN: CPT | Mod: GP | Performed by: PHYSICAL THERAPIST

## 2019-02-11 NOTE — PROGRESS NOTES
[facility] Initial Evaluation  Subjective:    Noemi Chi is a 48 year old female with a right shoulder condition.  Condition occurred with:  Repetition/overuse.    This is a new condition  8/11/18.    Patient reports pain:  Other and posterior (AC joint).  Radiates to: lateral epicondylitis on L elbow.  Pain is described as aching and is intermittent and reported as 3/10.  Associated symptoms:  Loss of strength.   Symptoms are exacerbated by lifting, using arm at shoulder level, using arm overhead, certain positions and using arm behind back and relieved by rest and NSAID's.  Since onset symptoms are gradually improving (less frequent pain, but modified activities).        General health as reported by patient is good.  Pertinent medical history includes:  Asthma.  Medical allergies: no.  Other surgeries include:  None reported.  Current medications:  None as reported by the patient.  Current occupation is Property management.  Patient is working in normal job without restrictions.  Primary job tasks include:  Driving and repetitive tasks.        Red flags:  None as reported by the patient.                        Objective:  System    Physical Exam    General     ROS  Observation/Posture: Rounded shoulders.   Scapulothoracic Rhythm: excellent  Functional Tests:    Reaching across shoulders: Painful/restricted   Reaching behind back: Painful/restricted   Reaching over shoulder: Painful/restricted    Shoulder ROM:   Left Right   Flexion 167 deg 146 deg   Abduction 148 deg 155 deg   External Rotation 99 deg 91 deg   Internal Rotation 50 deg 51 deg     Shoulder Strength:   Left Right   Flexion 4+/5 4+/5   Abduction 5/5 4+/5   Scaption 5/5 4+/5   External Rotation @0 4+/5 4/5   Internal Rotation @0 5/5 5/5   Extension 5/5 5/5   Horizontal Abduction 4+/5 4/5     Special Tests:  Empty Can: not done  Hawkin's: positive  Juancho: positive  O'briens:positive  Anterior apprehension:not  done  Posterior apprehension: not done  Sulcus: not done  Load and shift: not done    Assessment/Plan:    Patient is a 48 year old female with right side shoulder complaints.    Patient has the following significant findings with corresponding treatment plan.                Diagnosis 1:  R shoulder pain consistent with AC joint OA  Pain -  hot/cold therapy, US, manual therapy, splint/taping/bracing/orthotics, self management, education and home program  Decreased ROM/flexibility - manual therapy, therapeutic exercise and home program  Decreased strength - therapeutic exercise, therapeutic activities and home program    Therapy Evaluation Codes:   1) History comprised of:   Personal factors that impact the plan of care:      None.    Comorbidity factors that impact the plan of care are:      Asthma.     Medications impacting care: None.  2) Examination of Body Systems comprised of:   Body structures and functions that impact the plan of care:      Shoulder.   Activity limitations that impact the plan of care are:      Reaching.  3) Clinical presentation characteristics are:   Stable/Uncomplicated.  4) Decision-Making    Low complexity using standardized patient assessment instrument and/or measureable assessment of functional outcome.  Cumulative Therapy Evaluation is: Low complexity.    Previous and current functional limitations:  (See Goal Flow Sheet for this information)    Short term and Long term goals: (See Goal Flow Sheet for this information)     Communication ability:  Patient appears to be able to clearly communicate and understand verbal and written communication and follow directions correctly.  Treatment Explanation - The following has been discussed with the patient:   RX ordered/plan of care  Anticipated outcomes  Possible risks and side effects  This patient would benefit from PT intervention to resume normal activities.   Rehab potential is good.    Frequency:  1 X week, once daily  Duration:  for 8  weeks  Discharge Plan:  Achieve all LTG.  Independent in home treatment program.  Reach maximal therapeutic benefit.    Please refer to the daily flowsheet for treatment today, total treatment time and time spent performing 1:1 timed codes.

## 2019-02-18 ENCOUNTER — THERAPY VISIT (OUTPATIENT)
Dept: PHYSICAL THERAPY | Facility: CLINIC | Age: 49
End: 2019-02-18
Payer: COMMERCIAL

## 2019-02-18 DIAGNOSIS — M25.511 ACUTE PAIN OF RIGHT SHOULDER: ICD-10-CM

## 2019-02-18 PROCEDURE — 97112 NEUROMUSCULAR REEDUCATION: CPT | Mod: GP | Performed by: PHYSICAL THERAPIST

## 2019-02-18 PROCEDURE — 97110 THERAPEUTIC EXERCISES: CPT | Mod: GP | Performed by: PHYSICAL THERAPIST

## 2019-07-22 PROBLEM — M25.511 ACUTE PAIN OF RIGHT SHOULDER: Status: RESOLVED | Noted: 2019-02-11 | Resolved: 2019-07-22

## 2019-07-22 NOTE — PROGRESS NOTES
DISCHARGE REPORT  Patient has not returned to physical therapy, therefore, we are unable to assess the patient's progress.  Current status is unknown and we are unable to assess goals and functional outcome measures cannot be reported. Patient will be discharged from physical therapy as skilled physical therapy is no longer indicated. Please refer to the last SOAP note and/or progress report for specific details on functional status.

## 2019-11-05 ENCOUNTER — HEALTH MAINTENANCE LETTER (OUTPATIENT)
Age: 49
End: 2019-11-05

## 2020-01-03 NOTE — PROGRESS NOTES
SUBJECTIVE:   CC: Noemi Chi is an 49 year old woman who presents for preventive health visit.     Healthy Habits:    Do you get at least three servings of calcium containing foods daily (dairy, green leafy vegetables, etc.)? yes    Amount of exercise or daily activities, outside of work: 3 day(s) per week    Problems taking medications regularly No    Medication side effects: No    Have you had an eye exam in the past two years? yes    Do you see a dentist twice per year? Once a year    Do you have sleep apnea, excessive snoring or daytime drowsiness?no      Today's PHQ-2 Score:   PHQ-2 (  [employer]) 2020   Q1: Little interest or pleasure in doing things 0 0   Q2: Feeling down, depressed or hopeless 0 0   PHQ-2 Score 0 0   Q1: Little interest or pleasure in doing things - Not at all   Q2: Feeling down, depressed or hopeless - Not at all   PHQ-2 Score - 0       Abuse: Current or Past(Physical, Sexual or Emotional)- No  Do you feel safe in your environment? Yes        Social History     Tobacco Use     Smoking status: Former Smoker     Years: 1.00     Last attempt to quit: 3/31/2005     Years since quittin.7     Smokeless tobacco: Never Used   Substance Use Topics     Alcohol use: Yes     Comment: 6/ week     If you drink alcohol do you typically have >3 drinks per day or >7 drinks per week? No                     Reviewed orders with patient.  Reviewed health maintenance and updated orders accordingly - Yes  Lab work is in process    Mammogram not appropriate for this patient based on age.  Mammogram Screening: Patient under age 50, mutual decision reflected in health maintenance.      Pertinent mammograms are reviewed under the imaging tab.  History of abnormal Pap smear: NO - age 30-65 PAP every 5 years with negative HPV co-testing recommended  PAP / HPV 3/10/2015 2011 2007   PAP NIL NIL NIL   HPV 16 DNA Negative - -   HPV 18 DNA Negative - -   OTHER HR HPV Negative - -  "    Reviewed and updated as needed this visit by clinical staff  Tobacco  Allergies  Meds  Med Hx  Surg Hx  Fam Hx  Soc Hx        Reviewed and updated as needed this visit by Provider            ROS:  CONSTITUTIONAL: NEGATIVE for fever, chills, change in weight  INTEGUMENTARY/SKIN: NEGATIVE for worrisome rashes, moles or lesions  EYES: NEGATIVE for vision changes or irritation  ENT: NEGATIVE for ear, mouth and throat problems  RESP: NEGATIVE for significant cough or SOB  BREAST: NEGATIVE for masses, tenderness or discharge  CV: NEGATIVE for chest pain, palpitations or peripheral edema  GI: NEGATIVE for nausea, abdominal pain, heartburn, or change in bowel habits and hemorrhoids  : NEGATIVE for unusual urinary or vaginal symptoms. No vaginal bleeding.  MUSCULOSKELETAL: NEGATIVE for significant arthralgias or myalgia  NEURO: NEGATIVE for weakness, dizziness or paresthesias  ENDOCRINE: hot flashes  PSYCHIATRIC: NEGATIVE for changes in mood or affect     OBJECTIVE:   /68   Pulse 70   Temp 98.7  F (37.1  C) (Oral)   Ht 1.738 m (5' 8.43\")   Wt 81.8 kg (180 lb 6.4 oz)   LMP 08/08/2016 (Approximate)   SpO2 95%   BMI 27.09 kg/m    EXAM:  GENERAL: healthy, alert and no distress  NECK: no adenopathy, no asymmetry, masses, or scars and thyroid normal to palpation  RESP: lungs clear to auscultation - no rales, rhonchi or wheezes  BREAST: normal without masses, tenderness or nipple discharge and no palpable axillary masses or adenopathy  CV: regular rate and rhythm, normal S1 S2, no S3 or S4, no murmur, click or rub, no peripheral edema and peripheral pulses strong  ABDOMEN: soft, nontender, no hepatosplenomegaly, no masses and bowel sounds normal   (female): normal female external genitalia, normal urethral meatus, vaginal mucosa, normal cervix/adnexa/uterus without masses or discharge  RECTAL (female): normal sphincter tone, no rectal masses and + external hemorrhoid  MS: no gross musculoskeletal defects " "noted, no edema  SKIN: no suspicious lesions or rashes  NEURO: Normal strength and tone, mentation intact and speech normal  PSYCH: mentation appears normal, affect normal/bright    Diagnostic Test Results:  Labs reviewed in Epic  No results found for this or any previous visit (from the past 24 hour(s)).    ASSESSMENT/PLAN:       ICD-10-CM    1. Routine general medical examination at a health care facility Z00.00    2. Screening for cervical cancer Z12.4 Pap imaged thin layer screen with HPV - recommended age 30 - 65     HPV High Risk Types DNA Cervical   3. Intrinsic asthma with acute exacerbation, mild intermittent J45.21 albuterol (PROAIR HFA) 108 (90 Base) MCG/ACT inhaler       COUNSELING:   Reviewed preventive health counseling, as reflected in patient instructions       Regular exercise       Healthy diet/nutrition       (Brigitte)menopause management    Estimated body mass index is 27.09 kg/m  as calculated from the following:    Height as of this encounter: 1.738 m (5' 8.43\").    Weight as of this encounter: 81.8 kg (180 lb 6.4 oz).         reports that she quit smoking about 14 years ago. She quit after 1.00 year of use. She has never used smokeless tobacco.      Counseling Resources:  ATP IV Guidelines  Pooled Cohorts Equation Calculator  Breast Cancer Risk Calculator  FRAX Risk Assessment  ICSI Preventive Guidelines  Dietary Guidelines for Americans, 2010  USDA's MyPlate  ASA Prophylaxis  Lung CA Screening    KEEGAN Young [facility]  "

## 2020-01-07 ENCOUNTER — OFFICE VISIT (OUTPATIENT)
Dept: FAMILY MEDICINE | Facility: CLINIC | Age: 50
End: 2020-01-07
Payer: COMMERCIAL

## 2020-01-07 VITALS
DIASTOLIC BLOOD PRESSURE: 68 MMHG | HEIGHT: 68 IN | SYSTOLIC BLOOD PRESSURE: 106 MMHG | TEMPERATURE: 98.7 F | BODY MASS INDEX: 27.34 KG/M2 | WEIGHT: 180.4 LBS | HEART RATE: 70 BPM | OXYGEN SATURATION: 95 %

## 2020-01-07 DIAGNOSIS — Z00.00 ROUTINE GENERAL MEDICAL EXAMINATION AT A HEALTH CARE FACILITY: Primary | ICD-10-CM

## 2020-01-07 DIAGNOSIS — Z12.4 SCREENING FOR CERVICAL CANCER: ICD-10-CM

## 2020-01-07 DIAGNOSIS — J45.21: ICD-10-CM

## 2020-01-07 PROCEDURE — G0145 SCR C/V CYTO,THINLAYER,RESCR: HCPCS | Performed by: NURSE PRACTITIONER

## 2020-01-07 PROCEDURE — 87624 HPV HI-RISK TYP POOLED RSLT: CPT | Performed by: NURSE PRACTITIONER

## 2020-01-07 PROCEDURE — 99396 PREV VISIT EST AGE 40-64: CPT | Performed by: NURSE PRACTITIONER

## 2020-01-07 RX ORDER — ALBUTEROL SULFATE 90 UG/1
2 AEROSOL, METERED RESPIRATORY (INHALATION) EVERY 4 HOURS PRN
Qty: 2 INHALER | Refills: 3 | Status: SHIPPED | OUTPATIENT
Start: 2020-01-07 | End: 2021-06-30

## 2020-01-07 ASSESSMENT — MIFFLIN-ST. JEOR: SCORE: 1503.54

## 2020-01-09 LAB
COPATH REPORT: NORMAL
PAP: NORMAL

## 2020-01-12 LAB
FINAL DIAGNOSIS: NORMAL
HPV HR 12 DNA CVX QL NAA+PROBE: NEGATIVE
HPV16 DNA SPEC QL NAA+PROBE: NEGATIVE
HPV18 DNA SPEC QL NAA+PROBE: NEGATIVE
SPECIMEN DESCRIPTION: NORMAL
SPECIMEN SOURCE CVX/VAG CYTO: NORMAL

## 2020-03-03 ENCOUNTER — TELEPHONE (OUTPATIENT)
Dept: FAMILY MEDICINE | Facility: CLINIC | Age: 50
End: 2020-03-03

## 2020-03-03 NOTE — TELEPHONE ENCOUNTER
Panel Management Review      Patient has the following on her problem list:     Asthma review     ACT Total Scores 1/7/2019   ACT TOTAL SCORE -   ASTHMA ER VISITS -   ASTHMA HOSPITALIZATIONS -   ACT TOTAL SCORE (Goal Greater than or Equal to 20) 23   In the past 12 months, how many times did you visit the emergency room for your asthma without being admitted to the hospital? 0   In the past 12 months, how many times were you hospitalized overnight because of your asthma? 0      1. Is Asthma diagnosis on the Problem List? Yes   2. Is Asthma listed on Health Maintenance? Yes    3. Patient is due for:  ACT      Composite cancer screening  Chart review shows that this patient is due/due soon for the following None  Summary:    Patient is due/failing the following:   ACT    Action needed:   Patient needs to do ACT.    Type of outreach:    Sent [employer] message. and Sent letter.    Questions for provider review:    None                                                                                                                                    Thanks,  Ludy Das

## 2020-03-04 ENCOUNTER — TELEPHONE (OUTPATIENT)
Dept: FAMILY MEDICINE | Facility: CLINIC | Age: 50
End: 2020-03-04

## 2020-03-04 ENCOUNTER — OFFICE VISIT (OUTPATIENT)
Dept: FAMILY MEDICINE | Facility: CLINIC | Age: 50
End: 2020-03-04
Payer: COMMERCIAL

## 2020-03-04 VITALS
SYSTOLIC BLOOD PRESSURE: 102 MMHG | HEART RATE: 73 BPM | OXYGEN SATURATION: 97 % | DIASTOLIC BLOOD PRESSURE: 64 MMHG | TEMPERATURE: 97.9 F | BODY MASS INDEX: 26.28 KG/M2 | WEIGHT: 175 LBS

## 2020-03-04 DIAGNOSIS — R68.89 FLU-LIKE SYMPTOMS: ICD-10-CM

## 2020-03-04 DIAGNOSIS — J45.21: Primary | ICD-10-CM

## 2020-03-04 LAB
FLUAV+FLUBV AG SPEC QL: NEGATIVE
FLUAV+FLUBV AG SPEC QL: NEGATIVE
SPECIMEN SOURCE: NORMAL

## 2020-03-04 PROCEDURE — 99214 OFFICE O/P EST MOD 30 MIN: CPT | Performed by: FAMILY MEDICINE

## 2020-03-04 PROCEDURE — 87804 INFLUENZA ASSAY W/OPTIC: CPT | Performed by: FAMILY MEDICINE

## 2020-03-04 NOTE — PATIENT INSTRUCTIONS
Use steriodal inhaler twice per day for two weeks. Use the two puffs of albuterol inhaler before each meal. Use extension tube with inhalers. Follow up as needed.

## 2020-03-04 NOTE — TELEPHONE ENCOUNTER
Left vm for patient to return call to clinic. Re: message below fax received from pharmacy. She will need to call insurance company to see what inhaler is covered under insurance, that is comparable to Dulera (mometasone-formoterol).    Thanks  Lynda Miramontes RN   [facility] [place]

## 2020-03-04 NOTE — TELEPHONE ENCOUNTER
"Reinaldo sent a fax regarding  mometasone-formoterol (DULERA) 100-5 MCG/ACT inhaler  Stating \"Is there an alternative that is more cost effective\"  "

## 2020-03-04 NOTE — PROGRESS NOTES
Subjective     Noemi Chi is a 49 year old female who presents to clinic today for the following health issues:    HPI   Acute Illness   Acute illness concerns: flu symptoms   Onset: Saturday night     Fever: YES    Chills/Sweats: YES    Headache (location?): YES    Sinus Pressure:YES    Conjunctivitis:  no    Ear Pain: no    Rhinorrhea: no     Congestion: YES    Sore Throat: no      Cough: YES-non-productive    Wheeze: YES    Decreased Appetite: YES    Nausea: YES    Vomiting: no     Diarrhea:  YES    Dysuria/Freq.: YES    Fatigue/Achiness: YES    Sick/Strep Exposure: YES     Therapies Tried and outcome: Advil, inhaler, and vitamins    Patient's symptoms began approximately four days ago. She states her symptoms have improved, with her fever breaking and aching intensity diminishing. She has used her rescue inhaler 10-12 times per day and does not use a maintanince inhaler. She experiences CP while coughing and SOB if she has not used her inhaler in awhile. Other symptoms include chills/sweats, headache, sinus pressure, congestion, productive cough, non-productive cough, wheeze, decreased appetite, nausea, diarrhea and dysuria. She has been exposed to other sick individuals. In addition to the inhaler, she has used Advil and vitamins for relief.     Patient Active Problem List   Diagnosis     Mild intermittent asthma     Miscarriage     CARDIOVASCULAR SCREENING; LDL GOAL LESS THAN 160     Chronic rhinitis     Past Surgical History:   Procedure Laterality Date     HC TOOTH EXTRACTION W/FORCEP      [place] teeth       Social History     Tobacco Use     Smoking status: Former Smoker     Years: 1.00     Last attempt to quit: 3/31/2005     Years since quittin.9     Smokeless tobacco: Never Used   Substance Use Topics     Alcohol use: Yes     Comment: 6/ week     Family History   Problem Relation Age of Onset     Hypertension Mother      Gynecology Mother      Lipids Mother      C.A.D. Father          bypass surgery     Heart Disease Father      Hypertension Maternal Grandmother      CSelvinASelvinD. Maternal Grandmother         anureysm/ ruptured aortic     Hypertension Maternal Grandfather      Parkinsonism Paternal Aunt          Current Outpatient Medications   Medication Sig Dispense Refill     albuterol (PROAIR HFA) 108 (90 Base) MCG/ACT inhaler Inhale 2 puffs into the lungs every 4 hours as needed for shortness of breath / dyspnea or wheezing 2 Inhaler 3     mometasone-formoterol (DULERA) 100-5 MCG/ACT inhaler Inhale 2 puffs into the lungs 2 times daily 1 Inhaler 1     Multiple Vitamin (MULTIVITAMINS PO) Take  by mouth.       Allergies   Allergen Reactions     Cats      No Known Allergies      Seasonal Allergies      BP Readings from Last 3 Encounters:   03/04/20 102/64   01/07/20 106/68   01/14/19 115/77    Wt Readings from Last 3 Encounters:   03/04/20 79.4 kg (175 lb)   01/07/20 81.8 kg (180 lb 6.4 oz)   01/14/19 82.1 kg (181 lb)        Reviewed and updated as needed this visit by Provider       Review of Systems     Positive: headache, chest pain, shortness of breath, cough, chills/sweats, sinus pressure, congestion, wheeze, decreased appetite, nausea, diarrhea and dysuria    Denies insomnia, heartburn, bowel issues, bladder issues, neck pain, back pain, hip pain, knee pain, ankle pain, or foot pain. Remainder of ROS is negative unless otherwise noted above or in HPI.    This document serves as a record of the services and decisions personally performed and made by Lazaro Figueroa MD. It was created on his behalf by Rigo Hinds, trained medical scribe. The creation of this document is based on the provider's statements to the medical scribe.  Rigo Hinds 8:04 AM March 4, 2020      Objective    /64   Pulse 73   Temp 97.9  F (36.6  C) (Oral)   Wt 79.4 kg (175 lb)   LMP 08/08/2016 (LMP Unknown)   SpO2 97%   Breastfeeding No   BMI 26.28 kg/m    Body mass index is 26.28 kg/m .  Physical Exam   GENERAL:  healthy, alert and no distress  RESP: upper lungs clear to ausculatation, wheeze present, slightly prolonged expiratory phase, slightly decreased volume movement - no rales or rhonchi   MS: no gross musculoskeletal defects noted, no edema  SKIN: no suspicious lesions or rashes  NEURO: Normal strength and tone, mentation intact and speech normal  PSYCH: mentation appears normal, affect normal/bright  LYMPH: no cervical, supraclavicular, axillary, or inguinal adenopathy    Diagnostic Test Results:  Labs reviewed in Epic  Results for orders placed or performed in visit on 03/04/20 (from the past 24 hour(s))   Influenza A/B antigen   Result Value Ref Range    Influenza A/B Agn Specimen Nasopharyngeal     Influenza A Negative NEG^Negative    Influenza B Negative NEG^Negative         Assessment & Plan   (J45.21) Intrinsic asthma with acute exacerbation, mild intermittent  Comment: Does not currently use steroidal inhaler.  Plan: mometasone-formoterol (DULERA) 100-5 MCG/ACT         inhaler        Begin taking medication. Follow up as needed.    (R68.89) Flu-like symptoms  (primary encounter diagnosis)  Comment: Lab work completed.   Plan: Influenza A/B antigen        Influenza A/B negative, likely similar respiratory virus. Follow up as needed.    Patient Instructions   Use steriodal inhaler twice per day for two weeks. Use the two puffs of albuterol inhaler before each meal. Use extension tube with inhalers. Follow up as needed.   30 minutes were spent by me face to face with the patient with more than 50% of time spent in counseling and coordination of care regarding above assessment and plan INCLUDING how to use extension tube with MDIs, rational for adding maintenance MDI       .   The information in this document, created by the medical scribe for me, accurately reflects the services I personally performed and the decisions made by me. I have reviewed and approved this document for accuracy prior to leaving the  patient care area.  March 4, 2020 8:05 AM    Lazaro Figueroa MD  [facility] [place] [facility]

## 2020-03-16 ENCOUNTER — MYC MEDICAL ADVICE (OUTPATIENT)
Dept: FAMILY MEDICINE | Facility: CLINIC | Age: 50
End: 2020-03-16

## 2020-03-17 NOTE — TELEPHONE ENCOUNTER
Providers,    Please see patients my chart message.    Thanks  Lynda Miramontes RN   [facility]

## 2020-11-22 ENCOUNTER — HEALTH MAINTENANCE LETTER (OUTPATIENT)
Age: 50
End: 2020-11-22

## 2021-02-13 ENCOUNTER — HEALTH MAINTENANCE LETTER (OUTPATIENT)
Age: 51
End: 2021-02-13

## 2021-06-30 ENCOUNTER — OFFICE VISIT (OUTPATIENT)
Dept: FAMILY MEDICINE | Facility: CLINIC | Age: 51
End: 2021-06-30
Payer: COMMERCIAL

## 2021-06-30 VITALS
HEIGHT: 69 IN | DIASTOLIC BLOOD PRESSURE: 78 MMHG | OXYGEN SATURATION: 98 % | SYSTOLIC BLOOD PRESSURE: 118 MMHG | BODY MASS INDEX: 25.92 KG/M2 | WEIGHT: 175 LBS | HEART RATE: 72 BPM | TEMPERATURE: 97 F

## 2021-06-30 DIAGNOSIS — Z00.00 ROUTINE GENERAL MEDICAL EXAMINATION AT A HEALTH CARE FACILITY: Primary | ICD-10-CM

## 2021-06-30 DIAGNOSIS — J45.20 MILD INTERMITTENT ASTHMA, UNSPECIFIED WHETHER COMPLICATED: ICD-10-CM

## 2021-06-30 DIAGNOSIS — Z13.220 SCREENING CHOLESTEROL LEVEL: ICD-10-CM

## 2021-06-30 DIAGNOSIS — J45.21: ICD-10-CM

## 2021-06-30 DIAGNOSIS — Z23 ENCOUNTER FOR IMMUNIZATION: ICD-10-CM

## 2021-06-30 DIAGNOSIS — J31.0 CHRONIC RHINITIS: ICD-10-CM

## 2021-06-30 DIAGNOSIS — Z12.31 VISIT FOR SCREENING MAMMOGRAM: ICD-10-CM

## 2021-06-30 PROCEDURE — 90472 IMMUNIZATION ADMIN EACH ADD: CPT | Performed by: NURSE PRACTITIONER

## 2021-06-30 PROCEDURE — 90732 PPSV23 VACC 2 YRS+ SUBQ/IM: CPT | Performed by: NURSE PRACTITIONER

## 2021-06-30 PROCEDURE — 99396 PREV VISIT EST AGE 40-64: CPT | Mod: 25 | Performed by: NURSE PRACTITIONER

## 2021-06-30 PROCEDURE — 90715 TDAP VACCINE 7 YRS/> IM: CPT | Performed by: NURSE PRACTITIONER

## 2021-06-30 PROCEDURE — 90471 IMMUNIZATION ADMIN: CPT | Performed by: NURSE PRACTITIONER

## 2021-06-30 RX ORDER — ALBUTEROL SULFATE 90 UG/1
2 AEROSOL, METERED RESPIRATORY (INHALATION) EVERY 4 HOURS PRN
Qty: 2 G | Refills: 3 | Status: SHIPPED | OUTPATIENT
Start: 2021-06-30 | End: 2022-11-21

## 2021-06-30 ASSESSMENT — ASTHMA QUESTIONNAIRES
QUESTION_2 LAST FOUR WEEKS HOW OFTEN HAVE YOU HAD SHORTNESS OF BREATH: ONCE OR TWICE A WEEK
QUESTION_4 LAST FOUR WEEKS HOW OFTEN HAVE YOU USED YOUR RESCUE INHALER OR NEBULIZER MEDICATION (SUCH AS ALBUTEROL): TWO OR THREE TIMES PER WEEK
ACUTE_EXACERBATION_TODAY: NO
QUESTION_3 LAST FOUR WEEKS HOW OFTEN DID YOUR ASTHMA SYMPTOMS (WHEEZING, COUGHING, SHORTNESS OF BREATH, CHEST TIGHTNESS OR PAIN) WAKE YOU UP AT NIGHT OR EARLIER THAN USUAL IN THE MORNING: ONCE OR TWICE
ACT_TOTALSCORE: 20
QUESTION_1 LAST FOUR WEEKS HOW MUCH OF THE TIME DID YOUR ASTHMA KEEP YOU FROM GETTING AS MUCH DONE AT WORK, SCHOOL OR AT HOME: NONE OF THE TIME
QUESTION_5 LAST FOUR WEEKS HOW WOULD YOU RATE YOUR ASTHMA CONTROL: WELL CONTROLLED

## 2021-06-30 ASSESSMENT — MIFFLIN-ST. JEOR: SCORE: 1483.17

## 2021-06-30 NOTE — PROGRESS NOTES
SUBJECTIVE:   CC: Noemi Chi is an 50 year old woman who presents for preventive health visit.     {  Patient has been advised of split billing requirements and indicates understanding: Yes  Healthy Habits:    Do you get at least three servings of calcium containing foods daily (dairy, green leafy vegetables, etc.)? yes    Amount of exercise or daily activities, outside of work: 1 hour(s) per day    Problems taking medications regularly No    Medication side effects: No    Have you had an eye exam in the past two years? yes    Do you see a dentist twice per year? yes    Do you have sleep apnea, excessive snoring or daytime drowsiness?no    Asthma Follow-Up    Was ACT completed today?    Yes    ACT Total Scores 6/30/2021   ACT TOTAL SCORE -   ASTHMA ER VISITS -   ASTHMA HOSPITALIZATIONS -   ACT TOTAL SCORE (Goal Greater than or Equal to 20) 20   In the past 12 months, how many times did you visit the emergency room for your asthma without being admitted to the hospital? 0   In the past 12 months, how many times were you hospitalized overnight because of your asthma? 0          How many days per week do you miss taking your asthma controller medication?  I do not have an asthma controller medication    Please describe any recent triggers for your asthma: None    Have you had any Emergency Room Visits, Urgent Care Visits, or Hospital Admissions since your last office visit?  No      -------------------------------------    Today's PHQ-2 Score:   PHQ-2 ( 1999 [employer]) 6/30/2021 3/4/2020   Q1: Little interest or pleasure in doing things 0 0   Q2: Feeling down, depressed or hopeless 0 0   PHQ-2 Score 0 0   Q1: Little interest or pleasure in doing things - -   Q2: Feeling down, depressed or hopeless - -   PHQ-2 Score - -       Abuse: Current or Past(Physical, Sexual or Emotional)- No  Do you feel safe in your environment? Yes    Have you ever done Advance Care Planning? (For example, a Health Directive, POLST, or  a discussion with a medical provider or your loved ones about your wishes): No, advance care planning information given to patient to review.  Patient declined advance care planning discussion at this time.    Social History     Tobacco Use     Smoking status: Former Smoker     Years: 1.00     Quit date: 3/31/2005     Years since quittin.2     Smokeless tobacco: Never Used   Substance Use Topics     Alcohol use: Yes     Comment: 6/ week     If you drink alcohol do you typically have >3 drinks per day or >7 drinks per week? Yes - AUDIT SCORE:     AUDIT - Alcohol Use Disorders Identification Test - Reproduced from the World Health Organization Audit 2001 (Second Edition) 2019   1.  How often do you have a drink containing alcohol? 4 or more times a week   2.  How many drinks containing alcohol do you have on a typical day when you are drinking? 1 or 2   3.  How often do you have five or more drinks on one occasion? Less than monthly   4.  How often during the last year have you found that you were not able to stop drinking once you had started? Never   5.  How often during the last year have you failed to do what was normally expected of you because of drinking? Never   6.  How often during the last year have you needed a first drink in the morning to get yourself going after a heavy drinking session? Never   7.  How often during the last year have you had a feeling of guilt or remorse after drinking? Never   8.  How often during the last year have you been unable to remember what happened the night before because of your drinking? Never   9.  Have you or someone else been injured because of your drinking? No   10. Has a relative, friend, doctor or other health care worker been concerned about your drinking or suggested you cut down? No   TOTAL SCORE 5                        Reviewed orders with patient.  Reviewed health maintenance and updated orders accordingly - Yes  Lab work is in process    FHS-7: No  "flowsheet data found.  click delete button to remove this line now    Pertinent mammograms are reviewed under the imaging tab.    Pertinent mammograms are reviewed under the imaging tab.  History of abnormal Pap smear:   PAP / HPV Latest Ref Rng & Units 1/7/2020 3/10/2015 4/4/2011   PAP - NIL NIL NIL   HPV 16 DNA NEG:Negative Negative Negative -   HPV 18 DNA NEG:Negative Negative Negative -   OTHER HR HPV NEG:Negative Negative Negative -     Reviewed and updated as needed this visit by clinical staff  Tobacco  Allergies  Meds              Reviewed and updated as needed this visit by Provider                    ROS:  CONSTITUTIONAL: NEGATIVE for fever, chills, change in weight  INTEGUMENTARU/SKIN: NEGATIVE for worrisome rashes, moles or lesions  EYES: NEGATIVE for vision changes or irritation  ENT: NEGATIVE for ear, mouth and throat problems  RESP: NEGATIVE for significant cough or SOB  BREAST: NEGATIVE for masses, tenderness or discharge  CV: NEGATIVE for chest pain, palpitations or peripheral edema  GI: NEGATIVE for nausea, abdominal pain, heartburn, or change in bowel habits  : NEGATIVE for unusual urinary or vaginal symptoms. Periods are regular.  MUSCULOSKELETAL: NEGATIVE for significant arthralgias or myalgia  NEURO: NEGATIVE for weakness, dizziness or paresthesias  PSYCHIATRIC: NEGATIVE for changes in mood or affect    OBJECTIVE:   /78   Pulse 72   Temp 97  F (36.1  C) (Temporal)   Ht 1.753 m (5' 9\")   Wt 79.4 kg (175 lb)   LMP 08/08/2016 (LMP Unknown)   SpO2 98%   BMI 25.84 kg/m    EXAM:  GENERAL: healthy, alert and no distress  HENT: ear canals and TM's normal, nose and mouth without ulcers or lesions  NECK: no adenopathy, no asymmetry, masses, or scars and thyroid normal to palpation  RESP: lungs clear to auscultation - no rales, rhonchi or wheezes  CV: regular rate and rhythm, normal S1 S2, no S3 or S4, no murmur, click or rub, no peripheral edema and peripheral pulses strong  ABDOMEN: " "soft, nontender, no hepatosplenomegaly, no masses and bowel sounds normal  MS: no gross musculoskeletal defects noted, no edema  SKIN: no suspicious lesions or rashes  NEURO: Normal strength and tone, mentation intact and speech normal  PSYCH: mentation appears normal, affect normal/bright    Diagnostic Test Results:  Labs reviewed in Epic  No results found for this or any previous visit (from the past 24 hour(s)).    ASSESSMENT/PLAN:       ICD-10-CM    1. Routine general medical examination at a health care facility  Z00.00    2. Mild intermittent asthma, unspecified whether complicated  J45.20 **Comprehensive metabolic panel FUTURE anytime   3. Chronic rhinitis  J31.0    4. Intrinsic asthma with acute exacerbation, mild intermittent  J45.21 mometasone-formoterol (DULERA) 100-5 MCG/ACT inhaler     albuterol (PROAIR HFA) 108 (90 Base) MCG/ACT inhaler   5. Visit for screening mammogram  Z12.31 *MA Screening Digital Bilateral   6. Screening cholesterol level  Z13.220 Lipid panel reflex to direct LDL Fasting   7. Encounter for immunization  Z23 TDAP VACCINE (Adacel, Boostrix)  [[id number]]     Pneumococcal vaccine 23 valent PPSV23  (Pneumovax) [[id number]]       Patient has been advised of split billing requirements and indicates understanding: Yes  COUNSELING:   Reviewed preventive health counseling, as reflected in patient instructions    Estimated body mass index is 25.84 kg/m  as calculated from the following:    Height as of this encounter: 1.753 m (5' 9\").    Weight as of this encounter: 79.4 kg (175 lb).        She reports that she quit smoking about 16 years ago. She quit after 1.00 year of use. She has never used smokeless tobacco.      Counseling Resources:  ATP IV Guidelines  Pooled Cohorts Equation Calculator  Breast Cancer Risk Calculator  BRCA-Related Cancer Risk Assessment: FHS-7 Tool  FRAX Risk Assessment  ICSI Preventive Guidelines  Dietary Guidelines for Americans, 2010  USDA's MyPlate  ASA Prophylaxis  Lung " CA Screening    KEEGAN Young CNP [facility]

## 2021-07-01 ASSESSMENT — ASTHMA QUESTIONNAIRES: ACT_TOTALSCORE: 20

## 2021-09-19 ENCOUNTER — HEALTH MAINTENANCE LETTER (OUTPATIENT)
Age: 51
End: 2021-09-19

## 2021-09-27 DIAGNOSIS — J45.21: ICD-10-CM

## 2021-09-27 NOTE — TELEPHONE ENCOUNTER
"Requested Prescriptions   Pending Prescriptions Disp Refills     mometasone-formoterol (DULERA) 100-5 MCG/ACT inhaler 1 g 3     Sig: Inhale 2 puffs into the lungs 2 times daily       Inhaled Steroids Protocol Passed - 9/27/2021  1:23 PM        Passed - Patient is age 12 or older        Passed - Asthma control assessment score within normal limits in last 6 months     Please review ACT score.           Passed - Medication is active on med list        Passed - Recent (6 mo) or future (30 days) visit within the authorizing provider's specialty     Patient had office visit in the last 6 months or has a visit in the next 30 days with authorizing provider or within the authorizing provider's specialty.  See \"Patient Info\" tab in inbasket, or \"Choose Columns\" in Meds & Orders section of the refill encounter.           Long-Acting Beta Agonist Inhalers Protocol  Passed - 9/27/2021  1:23 PM        Passed - Patient is age 12 or older        Passed - Asthma control assessment score within normal limits in last 6 months     Please review ACT score.           Passed - Medication is active on med list        Passed - Recent (6 mo) or future (30 days) visit within the authorizing provider's specialty     Patient had office visit in the last 6 months or has a visit in the next 30 days with authorizing provider or within the authorizing provider's specialty.  See \"Patient Info\" tab in inbasket, or \"Choose Columns\" in Meds & Orders section of the refill encounter.               Signed Prescriptions:                        Disp   Refills    mometasone-formoterol (DULERA) 100-5 MCG/A*1 g    3        Sig: Inhale 2 puffs into the lungs 2 times daily  Authorizing Provider: STEPAN FAN  Ordering User: MARCELA MCKEON      "

## 2021-11-14 ENCOUNTER — HEALTH MAINTENANCE LETTER (OUTPATIENT)
Age: 51
End: 2021-11-14

## 2022-01-10 ENCOUNTER — MYC MEDICAL ADVICE (OUTPATIENT)
Dept: FAMILY MEDICINE | Facility: CLINIC | Age: 52
End: 2022-01-10
Payer: COMMERCIAL

## 2022-01-10 DIAGNOSIS — J45.21: ICD-10-CM

## 2022-01-10 NOTE — TELEPHONE ENCOUNTER
"ACT sent via [employer].    Requested Prescriptions   Signed Prescriptions Disp Refills    mometasone-formoterol (DULERA) 100-5 MCG/ACT inhaler 1 g 3     Sig: Inhale 2 puffs into the lungs 2 times daily       Inhaled Steroids Protocol Failed - 1/10/2022  7:46 AM        Failed - Asthma control assessment score within normal limits in last 6 months     Please review ACT score.           Failed - Recent (6 mo) or future (30 days) visit within the authorizing provider's specialty     Patient had office visit in the last 6 months or has a visit in the next 30 days with authorizing provider or within the authorizing provider's specialty.  See \"Patient Info\" tab in inbasket, or \"Choose Columns\" in Meds & Orders section of the refill encounter.            Passed - Patient is age 12 or older        Passed - Medication is active on med list       Long-Acting Beta Agonist Inhalers Protocol  Failed - 1/10/2022  7:46 AM        Failed - Asthma control assessment score within normal limits in last 6 months     Please review ACT score.           Failed - Recent (6 mo) or future (30 days) visit within the authorizing provider's specialty     Patient had office visit in the last 6 months or has a visit in the next 30 days with authorizing provider or within the authorizing provider's specialty.  See \"Patient Info\" tab in inbasket, or \"Choose Columns\" in Meds & Orders section of the refill encounter.            Passed - Patient is age 12 or older        Passed - Medication is active on med list           Tamia Jolley RN  [facility]     "

## 2022-01-10 NOTE — TELEPHONE ENCOUNTER
Reason for Call:  Medication or medication refill:    Do you use a [facility]?  Name of the pharmacy and phone number for the current request:  [facility] #[id number] - [place], [place] - [address] [place] [address] AT [facility] [address] (Pharmacy)    Name of the medication requested: mometasone-formoterol (DULERA) 100-5 MCG/ACT inhaler    Other request: PLAN DOES NOT COVER, PHARMACY IS REQUESTING SYMBICORT -4.5    Can we leave a detailed message on this number? YES    Phone number patient can be reached at: Cell number on file:    Telephone Information:   Mobile [phone]       Best Time: ANY    Call taken on 1/10/2022 at 7:45 AM by Fidelia Kim            mometasone-formoterol (DULERA) 100-5 MCG/ACT inhaler

## 2022-01-11 DIAGNOSIS — J45.21: ICD-10-CM

## 2022-01-11 NOTE — TELEPHONE ENCOUNTER
Reason for Call:  Medication or medication refill:    Do you use a [facility]?  Name of the pharmacy and phone number for the current request:  [facility] #[id number] - [place], [place] - [facility] [address] AT [address]    Name of the medication requested: mometasone-formoterol (DULERA) 100-5 MCG/ACT inhaler    Other request: Prior Authorization Needed    Can we leave a detailed message on this number? YES    Phone number patient can be reached at: Home number on file [phone] (home)    Best Time: Any    Call taken on 1/11/2022 at 11:08 AM by Marcy Sharma

## 2022-01-13 ENCOUNTER — TELEPHONE (OUTPATIENT)
Dept: FAMILY MEDICINE | Facility: CLINIC | Age: 52
End: 2022-01-13

## 2022-01-13 ENCOUNTER — TELEPHONE (OUTPATIENT)
Dept: FAMILY MEDICINE | Facility: CLINIC | Age: 52
End: 2022-01-13
Payer: COMMERCIAL

## 2022-01-13 DIAGNOSIS — J45.21: ICD-10-CM

## 2022-01-13 NOTE — TELEPHONE ENCOUNTER
"Reason for Call:  Medication or medication refill:    Do you use a [facility]?  Name of the pharmacy and phone number for the current request: [facility] #[id number] - [place] [address]    Name of the medication requested: mometasone-formoterol (DULERA) 100-5 MCG/ACT inhaler    Other request: \"PLAN DOES NOT COVER THIS MEDICATION. PLEASE CALL PLAN [phone] TO INITIATE PRIOR AUTH OR CALL/FAX PHARMACY TO CHANGE MEDICATION. INS IS ASKING FOR ALTERNATIVE.\"    Can we leave a detailed message on this number? YES    Phone number patient can be reached at: Home number on file [phone] (home)    Best Time: ANY    Call taken on 1/13/2022 at 1:08 PM by Brittney Simpson    "

## 2022-01-13 NOTE — TELEPHONE ENCOUNTER
PA group,    Can you please initiate a prior auth for this medication?    Thanks,  LIONEL Cantrell  [facility]

## 2022-01-18 NOTE — TELEPHONE ENCOUNTER
Central Prior Authorization Team   Phone: [phone]    PA Initiation    Medication: Dulera  Insurance Company: [employer] - Phone [phone] Fax [phone]  Pharmacy Filling the Rx: [facility] #[id number] - [place], [place] - [address] [place] [address] AT [facility] [address]  Filling Pharmacy Phone: [phone]  Filling Pharmacy Fax: [phone]  Start Date: 1/18/2022

## 2022-01-18 NOTE — TELEPHONE ENCOUNTER
PRIOR AUTHORIZATION DENIED    Medication: Dulera-DENIED    Denial Date: 1/18/2022    Denial Rational: PATIENT MUST TRY/FAIL FORMULARY ALTERNATIVES - ADVAIR DISKUS, ADVAIR HFA, BREO ELLIPTA, SYMBICORT.        Appeal Information:  IF PATIENT IS UNABLE TO TRY/FAIL ALTERNATIVE(S) PLEASE SUPPLY PA TEAM WITH A LETTER OF MEDICAL NECESSITY WITH CLINICAL REASON.

## 2022-03-05 ENCOUNTER — HEALTH MAINTENANCE LETTER (OUTPATIENT)
Age: 52
End: 2022-03-05

## 2022-08-20 ENCOUNTER — HEALTH MAINTENANCE LETTER (OUTPATIENT)
Age: 52
End: 2022-08-20

## 2022-11-20 ENCOUNTER — HEALTH MAINTENANCE LETTER (OUTPATIENT)
Age: 52
End: 2022-11-20

## 2022-11-20 DIAGNOSIS — J45.21: ICD-10-CM

## 2022-11-21 DIAGNOSIS — J45.21: ICD-10-CM

## 2022-11-21 RX ORDER — ALBUTEROL SULFATE 90 UG/1
AEROSOL, METERED RESPIRATORY (INHALATION)
Qty: 6.7 G | Refills: 1 | Status: SHIPPED | OUTPATIENT
Start: 2022-11-21 | End: 2024-05-09

## 2022-11-21 NOTE — TELEPHONE ENCOUNTER
Routing refill request to provider for review/approval because:  Patient needs to be seen because it has been more than 1 year since last office visit.   ACT was done on 1/10/22 score was 22 no emergent visits   Mychart message to pt to set up appointment    Charo Flores RN   [facility]

## 2022-11-21 NOTE — TELEPHONE ENCOUNTER
"Requested Prescriptions   Pending Prescriptions Disp Refills     mometasone-formoterol (DULERA) 100-5 MCG/ACT inhaler 1 g 3     Sig: Inhale 2 puffs into the lungs 2 times daily       Inhaled Steroids Protocol Failed - 11/21/2022 10:45 AM        Failed - Asthma control assessment score within normal limits in last 6 months     Please review ACT score.           Failed - Recent (6 mo) or future (30 days) visit within the authorizing provider's specialty     Patient had office visit in the last 6 months or has a visit in the next 30 days with authorizing provider or within the authorizing provider's specialty.  See \"Patient Info\" tab in inbasket, or \"Choose Columns\" in Meds & Orders section of the refill encounter.            Passed - Patient is age 12 or older        Passed - Medication is active on med list       Long-Acting Beta Agonist Inhalers Protocol  Failed - 11/21/2022 10:45 AM        Failed - Asthma control assessment score within normal limits in last 6 months     Please review ACT score.           Failed - Recent (6 mo) or future (30 days) visit within the authorizing provider's specialty     Patient had office visit in the last 6 months or has a visit in the next 30 days with authorizing provider or within the authorizing provider's specialty.  See \"Patient Info\" tab in inbasket, or \"Choose Columns\" in Meds & Orders section of the refill encounter.            Passed - Patient is age 12 or older        Passed - Order for Serevent, Striverdi, or Foradil and pt has steroid inhaler        Passed - Medication is active on med list           Last office visit 6/30/22.  Has appointment scheduled for 2/7/22. Routing to provider for approval.     Nora Morejon RN on 11/21/2022 at 12:09 PM    "

## 2022-11-25 DIAGNOSIS — J45.30 MILD PERSISTENT ASTHMA WITHOUT COMPLICATION: Primary | ICD-10-CM

## 2022-11-25 RX ORDER — BUDESONIDE AND FORMOTEROL FUMARATE DIHYDRATE 160; 4.5 UG/1; UG/1
2 AEROSOL RESPIRATORY (INHALATION) 2 TIMES DAILY
Qty: 10.2 G | Refills: 1 | Status: SHIPPED | OUTPATIENT
Start: 2022-11-25 | End: 2023-02-07

## 2022-11-25 NOTE — TELEPHONE ENCOUNTER
Requested Prescriptions   Pending Prescriptions Disp Refills     budesonide-formoterol (SYMBICORT) 160-4.5 MCG/ACT Inhaler       Sig: Inhale 2 puffs into the lungs 2 times daily       There is no refill protocol information for this order        Routed to provider for approval. Insurance no longer covered Dulera. Patient requesting alternative.    Has appointment scheduled 2/7/23.     Nora Morejon RN  [facility]

## 2022-11-25 NOTE — TELEPHONE ENCOUNTER
Plan does not cover    mometasone-formoterol (DULERA) 100-5 MCG/ACT inhaler    Please send alternative to pharmacy: SYMBICORT -4.5

## 2023-02-06 ASSESSMENT — ASTHMA QUESTIONNAIRES
ACT_TOTALSCORE: 20
QUESTION_5 LAST FOUR WEEKS HOW WOULD YOU RATE YOUR ASTHMA CONTROL: COMPLETELY CONTROLLED
QUESTION_4 LAST FOUR WEEKS HOW OFTEN HAVE YOU USED YOUR RESCUE INHALER OR NEBULIZER MEDICATION (SUCH AS ALBUTEROL): TWO OR THREE TIMES PER WEEK
QUESTION_3 LAST FOUR WEEKS HOW OFTEN DID YOUR ASTHMA SYMPTOMS (WHEEZING, COUGHING, SHORTNESS OF BREATH, CHEST TIGHTNESS OR PAIN) WAKE YOU UP AT NIGHT OR EARLIER THAN USUAL IN THE MORNING: ONCE A WEEK
ACT_TOTALSCORE: 20
QUESTION_2 LAST FOUR WEEKS HOW OFTEN HAVE YOU HAD SHORTNESS OF BREATH: ONCE OR TWICE A WEEK
QUESTION_1 LAST FOUR WEEKS HOW MUCH OF THE TIME DID YOUR ASTHMA KEEP YOU FROM GETTING AS MUCH DONE AT WORK, SCHOOL OR AT HOME: NONE OF THE TIME

## 2023-02-06 ASSESSMENT — ENCOUNTER SYMPTOMS
DIZZINESS: 0
SHORTNESS OF BREATH: 0
COUGH: 0
ARTHRALGIAS: 1
PALPITATIONS: 1
HEARTBURN: 0
JOINT SWELLING: 0
HEMATOCHEZIA: 1
EYE PAIN: 0
PARESTHESIAS: 0
SORE THROAT: 0
HEADACHES: 0
CONSTIPATION: 0
FREQUENCY: 0
NERVOUS/ANXIOUS: 0
ABDOMINAL PAIN: 0
NAUSEA: 0
WEAKNESS: 1
CHILLS: 0
BREAST MASS: 0
DYSURIA: 0
FEVER: 0
DIARRHEA: 0
MYALGIAS: 0
HEMATURIA: 0

## 2023-02-07 ENCOUNTER — OFFICE VISIT (OUTPATIENT)
Dept: FAMILY MEDICINE | Facility: CLINIC | Age: 53
End: 2023-02-07
Payer: COMMERCIAL

## 2023-02-07 VITALS
BODY MASS INDEX: 27.55 KG/M2 | TEMPERATURE: 97.2 F | RESPIRATION RATE: 16 BRPM | HEART RATE: 67 BPM | DIASTOLIC BLOOD PRESSURE: 74 MMHG | SYSTOLIC BLOOD PRESSURE: 120 MMHG | OXYGEN SATURATION: 98 % | WEIGHT: 186 LBS | HEIGHT: 69 IN

## 2023-02-07 DIAGNOSIS — Z00.00 ROUTINE HISTORY AND PHYSICAL EXAMINATION OF ADULT: Primary | ICD-10-CM

## 2023-02-07 DIAGNOSIS — I49.9 IRREGULAR HEART RATE: ICD-10-CM

## 2023-02-07 DIAGNOSIS — Z12.31 VISIT FOR SCREENING MAMMOGRAM: ICD-10-CM

## 2023-02-07 DIAGNOSIS — Z12.11 SPECIAL SCREENING FOR MALIGNANT NEOPLASMS, COLON: ICD-10-CM

## 2023-02-07 DIAGNOSIS — J45.30 MILD PERSISTENT ASTHMA WITHOUT COMPLICATION: ICD-10-CM

## 2023-02-07 DIAGNOSIS — Z13.220 LIPID SCREENING: ICD-10-CM

## 2023-02-07 LAB
ALBUMIN SERPL BCG-MCNC: 4.5 G/DL (ref 3.5–5.2)
ALP SERPL-CCNC: 79 U/L (ref 35–104)
ALT SERPL W P-5'-P-CCNC: 14 U/L (ref 10–35)
ANION GAP SERPL CALCULATED.3IONS-SCNC: 10 MMOL/L (ref 7–15)
AST SERPL W P-5'-P-CCNC: 21 U/L (ref 10–35)
BILIRUB SERPL-MCNC: 0.4 MG/DL
BUN SERPL-MCNC: 11.4 MG/DL (ref 6–20)
CALCIUM SERPL-MCNC: 9.2 MG/DL (ref 8.6–10)
CHLORIDE SERPL-SCNC: 106 MMOL/L (ref 98–107)
CHOLEST SERPL-MCNC: 230 MG/DL
CREAT SERPL-MCNC: 0.68 MG/DL (ref 0.51–0.95)
DEPRECATED HCO3 PLAS-SCNC: 23 MMOL/L (ref 22–29)
GFR SERPL CREATININE-BSD FRML MDRD: >90 ML/MIN/1.73M2
GLUCOSE SERPL-MCNC: 98 MG/DL (ref 70–99)
HDLC SERPL-MCNC: 71 MG/DL
LDLC SERPL CALC-MCNC: 147 MG/DL
NONHDLC SERPL-MCNC: 159 MG/DL
POTASSIUM SERPL-SCNC: 4.4 MMOL/L (ref 3.4–5.3)
PROT SERPL-MCNC: 6.9 G/DL (ref 6.4–8.3)
SODIUM SERPL-SCNC: 139 MMOL/L (ref 136–145)
TRIGL SERPL-MCNC: 58 MG/DL

## 2023-02-07 PROCEDURE — 90677 PCV20 VACCINE IM: CPT | Performed by: NURSE PRACTITIONER

## 2023-02-07 PROCEDURE — 0124A COVID-19 VACCINE BIVALENT BOOSTER 12+ (PFIZER): CPT | Performed by: NURSE PRACTITIONER

## 2023-02-07 PROCEDURE — 99214 OFFICE O/P EST MOD 30 MIN: CPT | Mod: 25 | Performed by: NURSE PRACTITIONER

## 2023-02-07 PROCEDURE — 99396 PREV VISIT EST AGE 40-64: CPT | Mod: 25 | Performed by: NURSE PRACTITIONER

## 2023-02-07 PROCEDURE — 90471 IMMUNIZATION ADMIN: CPT | Performed by: NURSE PRACTITIONER

## 2023-02-07 PROCEDURE — 36415 COLL VENOUS BLD VENIPUNCTURE: CPT | Performed by: NURSE PRACTITIONER

## 2023-02-07 PROCEDURE — 91312 COVID-19 VACCINE BIVALENT BOOSTER 12+ (PFIZER): CPT | Performed by: NURSE PRACTITIONER

## 2023-02-07 PROCEDURE — 80061 LIPID PANEL: CPT | Performed by: NURSE PRACTITIONER

## 2023-02-07 PROCEDURE — 80053 COMPREHEN METABOLIC PANEL: CPT | Performed by: NURSE PRACTITIONER

## 2023-02-07 RX ORDER — BUDESONIDE AND FORMOTEROL FUMARATE DIHYDRATE 160; 4.5 UG/1; UG/1
2 AEROSOL RESPIRATORY (INHALATION) 2 TIMES DAILY
Qty: 10.2 G | Refills: 1 | Status: SHIPPED | OUTPATIENT
Start: 2023-02-07 | End: 2024-05-09

## 2023-02-07 ASSESSMENT — ENCOUNTER SYMPTOMS
DYSURIA: 0
EYE PAIN: 0
SHORTNESS OF BREATH: 0
ARTHRALGIAS: 1
HEADACHES: 0
COUGH: 0
NERVOUS/ANXIOUS: 0
MYALGIAS: 0
FREQUENCY: 0
ABDOMINAL PAIN: 0
CHILLS: 0
NAUSEA: 0
HEMATOCHEZIA: 1
DIARRHEA: 0
FEVER: 0
WEAKNESS: 1
HEARTBURN: 0
HEMATURIA: 0
PARESTHESIAS: 0
SORE THROAT: 0
JOINT SWELLING: 0
BREAST MASS: 0
DIZZINESS: 0
CONSTIPATION: 0
PALPITATIONS: 1

## 2023-02-07 ASSESSMENT — PAIN SCALES - GENERAL: PAINLEVEL: MODERATE PAIN (5)

## 2023-02-07 NOTE — PROGRESS NOTES
SUBJECTIVE:   CC: Noemi is an 52 year old who presents for preventive health visit.   Patient has been advised of split billing requirements and indicates understanding: Yes  Healthy Habits:     Getting at least 3 servings of Calcium per day:  Yes    Bi-annual eye exam:  Yes    Dental care twice a year:  Yes    Sleep apnea or symptoms of sleep apnea:  None    Diet:  Regular (no restrictions)    Frequency of exercise:  2-3 days/week    Duration of exercise:  45-60 minutes    Taking medications regularly:  Yes    Medication side effects:  None    PHQ-2 Total Score: 0    Additional concerns today:  No      Sometimes feels like heart flutters at night when she is going to sleep; no pain with it. NO shortness of breath. Occasionally feels like she may be more short of breath with activities than previously. She is a cross country skier. No swelling in ankles, no cough. Dad has a history of MI, pacemaker, CAD. She has not had any recent changes in diet; no increased caffeine use. She has cut down on alcohol in the past year, typically less than 2 per day. No recent weight changes   Asthma Follow-Up    Was ACT completed today?    Yes    ACT Total Scores 2/6/2023   ACT TOTAL SCORE -   ASTHMA ER VISITS -   ASTHMA HOSPITALIZATIONS -   ACT TOTAL SCORE (Goal Greater than or Equal to 20) 20   In the past 12 months, how many times did you visit the emergency room for your asthma without being admitted to the hospital? 0   In the past 12 months, how many times were you hospitalized overnight because of your asthma? 0          How many days per week do you miss taking your asthma controller medication?  0    Please describe any recent triggers for your asthma: exercise or sports and cold air    Have you had any Emergency Room Visits, Urgent Care Visits, or Hospital Admissions since your last office visit?  No          Today's PHQ-2 Score:   PHQ-2 ( 1999 [employer]) 2/6/2023   Q1: Little interest or pleasure in doing things 0   Q2:  Feeling down, depressed or hopeless 0   PHQ-2 Score 0   PHQ-2 Total Score (12-17 Years)- Positive if 3 or more points; Administer PHQ-A if positive -   Q1: Little interest or pleasure in doing things Not at all   Q2: Feeling down, depressed or hopeless Not at all   PHQ-2 Score 0           Social History     Tobacco Use     Smoking status: Former     Years: 1.00     Types: Cigarettes     Quit date: 3/31/2005     Years since quittin.8     Smokeless tobacco: Never   Substance Use Topics     Alcohol use: Yes     Comment: 6/ week     If you drink alcohol do you typically have >3 drinks per day or >7 drinks per week? No      Reviewed orders with patient.  Reviewed health maintenance and updated orders accordingly - Yes      Breast Cancer Screening:    Breast CA Risk Assessment (FHS-7) 2023   Do you have a family history of breast, colon, or ovarian cancer? No / Unknown       click delete button to remove this line now    Pertinent mammograms are reviewed under the imaging tab.    History of abnormal Pap smear: NO - age 30-65 PAP every 5 years with negative HPV co-testing recommended  PAP / HPV Latest Ref Rng & Units 2020 3/10/2015 2011   PAP (Historical) - NIL NIL NIL   HPV16 NEG:Negative Negative Negative -   HPV18 NEG:Negative Negative Negative -   HRHPV NEG:Negative Negative Negative -     Reviewed and updated as needed this visit by clinical staff   Tobacco  Allergies  Meds     Fam Hx          Reviewed and updated as needed this visit by Provider   Tobacco       Fam Hx             Review of Systems   Constitutional: Negative for chills and fever.   HENT: Negative for congestion, ear pain, hearing loss and sore throat.    Eyes: Negative for pain and visual disturbance.   Respiratory: Negative for cough and shortness of breath.    Cardiovascular: Positive for palpitations. Negative for chest pain and peripheral edema.   Gastrointestinal: Positive for hematochezia. Negative for abdominal pain,  "constipation, diarrhea, heartburn and nausea.   Breasts:  Negative for tenderness, breast mass and discharge.   Genitourinary: Negative for dysuria, frequency, genital sores, hematuria, pelvic pain, urgency, vaginal bleeding and vaginal discharge.   Musculoskeletal: Positive for arthralgias. Negative for joint swelling and myalgias.   Skin: Negative for rash.   Neurological: Positive for weakness. Negative for dizziness, headaches and paresthesias.   Psychiatric/Behavioral: Negative for mood changes. The patient is not nervous/anxious.      CONSTITUTIONAL: NEGATIVE for fever, chills, change in weight  INTEGUMENTARY/SKIN: NEGATIVE for worrisome rashes, moles or lesions  EYES: NEGATIVE for vision changes or irritation  ENT: NEGATIVE for ear, mouth and throat problems  RESP: NEGATIVE for significant cough or SOB  BREAST: NEGATIVE for masses, tenderness or discharge  CV: NEGATIVE for chest pain/chest pressure, claudication, cyanosis, diaphoresis, dyspnea on exertion, HX HTN, lower extremity edema, orthopnea, paroxysmal nocturnal dyspnea and syncope or near-syncope  GI: NEGATIVE for nausea, abdominal pain, heartburn, or change in bowel habits  : NEGATIVE for unusual urinary or vaginal symptoms. No vaginal bleeding.  MUSCULOSKELETAL: NEGATIVE for significant arthralgias or myalgia  NEURO: NEGATIVE for weakness, dizziness or paresthesias  PSYCHIATRIC: NEGATIVE for changes in mood or affect      OBJECTIVE:   /74   Pulse 67   Temp 97.2  F (36.2  C) (Temporal)   Resp 16   Ht 1.753 m (5' 9\")   Wt 84.4 kg (186 lb)   LMP 08/08/2016 (LMP Unknown)   SpO2 98%   BMI 27.47 kg/m    Physical Exam  GENERAL: healthy, alert and no distress  EYES: Eyes grossly normal to inspection, PERRL and conjunctivae and sclerae normal  NECK: no adenopathy, no asymmetry, masses, or scars and thyroid normal to palpation  RESP: lungs clear to auscultation - no rales, rhonchi or wheezes  BREAST: normal without masses, tenderness or nipple " discharge and no palpable axillary masses or adenopathy  CV: regular rate and rhythm, normal S1 S2, no S3 or S4, no murmur, click or rub, no peripheral edema and peripheral pulses strong  ABDOMEN: soft, nontender, no hepatosplenomegaly, no masses and bowel sounds normal  MS: no gross musculoskeletal defects noted, no edema  SKIN: no suspicious lesions or rashes  NEURO: Normal strength and tone, mentation intact and speech normal  PSYCH: mentation appears normal, affect normal/bright  LYMPH: no cervical, supraclavicular, axillary, or inguinal adenopathy    Diagnostic Test Results:  Labs reviewed in Epic  No results found for this or any previous visit (from the past 24 hour(s)).    ASSESSMENT/PLAN:       ICD-10-CM    1. Routine history and physical examination of adult  Z00.00       2. Visit for screening mammogram  Z12.31 MA SCREENING DIGITAL BILAT - Future  (s+30)      3. Mild persistent asthma without complication  J45.30 budesonide-formoterol (SYMBICORT) 160-4.5 MCG/ACT Inhaler      4. Irregular heart rate  I49.9 Adult Leadless EKG Monitor 3 to 7 Days     Adult Cardiology Eval  Referral      5. Special screening for malignant neoplasms, colon  Z12.11 Colonoscopy Screening  Referral      6. Lipid screening  Z13.220 Lipid panel reflex to direct LDL Fasting     Comprehensive metabolic panel (BMP + Alb, Alk Phos, ALT, AST, Total. Bili, TP)     Lipid panel reflex to direct LDL Fasting     Comprehensive metabolic panel (BMP + Alb, Alk Phos, ALT, AST, Total. Bili, TP)                COUNSELING:  Reviewed preventive health counseling, as reflected in patient instructions       Regular exercise       Healthy diet/nutrition        She reports that she quit smoking about 17 years ago. Her smoking use included cigarettes. She has never used smokeless tobacco.      Linda Bennett, KEEGAN CNP  [facility]

## 2023-02-07 NOTE — TELEPHONE ENCOUNTER
RECORDS RECEIVED FROM:   DATE RECEIVED:   NOTES STATUS DETAILS   OFFICE NOTE from referring provider    Dianne Bennett 2-7-23   OFFICE NOTE from other cardiologist    N/A    SUMMARY from hospital/ED   N/A    MEDICATION LIST   Internal    DIAGNOSTIC PROCEDURES     EKG   N/A    Monitor Reports   N/A    IMAGING (DISC & REPORT)      Echo   N/A    Stress Tests   N/A    Cath   N/A    MRI/MRA   N/A    CT/CTA   N/A

## 2023-02-08 ENCOUNTER — ANCILLARY PROCEDURE (OUTPATIENT)
Dept: CARDIOLOGY | Facility: CLINIC | Age: 53
End: 2023-02-08
Attending: STUDENT IN AN ORGANIZED HEALTH CARE EDUCATION/TRAINING PROGRAM
Payer: COMMERCIAL

## 2023-02-08 ENCOUNTER — OFFICE VISIT (OUTPATIENT)
Dept: CARDIOLOGY | Facility: CLINIC | Age: 53
End: 2023-02-08
Attending: NURSE PRACTITIONER
Payer: COMMERCIAL

## 2023-02-08 ENCOUNTER — PRE VISIT (OUTPATIENT)
Dept: CARDIOLOGY | Facility: CLINIC | Age: 53
End: 2023-02-08
Payer: COMMERCIAL

## 2023-02-08 VITALS
SYSTOLIC BLOOD PRESSURE: 114 MMHG | BODY MASS INDEX: 27.55 KG/M2 | DIASTOLIC BLOOD PRESSURE: 76 MMHG | WEIGHT: 186 LBS | OXYGEN SATURATION: 98 % | HEART RATE: 67 BPM | HEIGHT: 69 IN

## 2023-02-08 DIAGNOSIS — R00.2 PALPITATIONS: ICD-10-CM

## 2023-02-08 DIAGNOSIS — E66.3 OVERWEIGHT (BMI 25.0-29.9): ICD-10-CM

## 2023-02-08 DIAGNOSIS — R06.09 DYSPNEA ON EXERTION: ICD-10-CM

## 2023-02-08 DIAGNOSIS — R00.2 PALPITATIONS: Primary | ICD-10-CM

## 2023-02-08 DIAGNOSIS — Z13.6 CARDIOVASCULAR SCREENING; LDL GOAL LESS THAN 160: ICD-10-CM

## 2023-02-08 PROCEDURE — 93242 EXT ECG>48HR<7D RECORDING: CPT

## 2023-02-08 PROCEDURE — 93005 ELECTROCARDIOGRAM TRACING: CPT

## 2023-02-08 PROCEDURE — G0463 HOSPITAL OUTPT CLINIC VISIT: HCPCS | Mod: 25

## 2023-02-08 PROCEDURE — 99204 OFFICE O/P NEW MOD 45 MIN: CPT | Mod: 25 | Performed by: STUDENT IN AN ORGANIZED HEALTH CARE EDUCATION/TRAINING PROGRAM

## 2023-02-08 PROCEDURE — G0463 HOSPITAL OUTPT CLINIC VISIT: HCPCS | Mod: 25 | Performed by: STUDENT IN AN ORGANIZED HEALTH CARE EDUCATION/TRAINING PROGRAM

## 2023-02-08 PROCEDURE — 93244 EXT ECG>48HR<7D REV&INTERPJ: CPT | Performed by: INTERNAL MEDICINE

## 2023-02-08 RX ORDER — LORATADINE 10 MG/1
10 CAPSULE, LIQUID FILLED ORAL PRN
COMMUNITY

## 2023-02-08 ASSESSMENT — PAIN SCALES - GENERAL: PAINLEVEL: NO PAIN (0)

## 2023-02-08 NOTE — PROGRESS NOTES
Cardiology Clinic Note    HPI  Dear colleagues,     I had the pleasure of seeing Ms. Noemi Chi in the Cardiology clinic.  As you know, Ms. Noemi Chi is a pleasant 52 year old female with a past medical history of mild persistent asthma and HLD who presents for evaluation of palpitations. She was seen by her PCP for this yesterday and a cardiac monitor was ordered, but has not been placed yet.    Patient reports she occasionally feels palpitations when she is going to sleep. She denies having these when up and moving. There is no associated pain or shortness of breath with this. She is a cross country skier, and sometimes has a little more shortness of breath with certain activities like skiing and climbing the stairs in the last few months, but is still able to get through all her usual activities without difficulties. Patient has not changed her caffeine use recently, and does drink ETOH socially, but usually 1-2 at a time, and not every day. She denies any recent change in diet. She has gained weight in the last year despite no change in diet or exercise. She denies presyncope, syncope, chest pain, pressure, SOB, abdominal pain, nausea, vomiting, orthopnea, PND, LE edema, or weakness. Patient notes her cholesterol was also more elevated recently.    PAST MEDICAL HISTORY:  Patient Active Problem List   Diagnosis     Mild intermittent asthma     CARDIOVASCULAR SCREENING; LDL GOAL LESS THAN 160     Chronic rhinitis        FAMILY HISTORY:  Family History   Problem Relation Age of Onset     Hypertension Mother      Gynecology Mother      Lipids Mother      C.A.D. Father         bypass surgery     Heart Disease Father      Pacemaker Father      Myocardial Infarction Father      No Known Problems Brother      Hypertension Maternal Grandmother      C.A.D. Maternal Grandmother         anureysm/ ruptured aortic     Hypertension Maternal Grandfather      Parkinsonism Paternal Aunt      Breast Cancer No  "family hx of      Colon Cancer No family hx of    Father with history of CAD and CABG now with CHF and Afib.    SOCIAL HISTORY:  Social History     Tobacco Use     Smoking status: Former     Years: 1.00     Types: Cigarettes     Quit date: 3/31/2005     Years since quittin.8     Smokeless tobacco: Never   Vaping Use     Vaping Use: Never used   Substance Use Topics     Alcohol use: Yes     Comment: 6/ week     Drug use: No        ALLERGIES:  Allergies   Allergen Reactions     Cats      Seasonal Allergies        CURRENT MEDICATIONS:  Current Outpatient Medications   Medication Sig Dispense Refill     albuterol (PROAIR HFA/PROVENTIL HFA/VENTOLIN HFA) 108 (90 Base) MCG/ACT inhaler INHALE 2 PUFFS INTO THE LUNGS EVERY 4 HOURS AS NEEDED FOR SHORTNESS OF BREATH OR DIFFICULT BREATHING OR WHEEZING 6.7 g 1     loratadine (CLARITIN) 10 MG capsule Take 10 mg by mouth as needed for allergies       Multiple Vitamin (MULTIVITAMINS PO) Take  by mouth.       budesonide-formoterol (SYMBICORT) 160-4.5 MCG/ACT Inhaler Inhale 2 puffs into the lungs 2 times daily (Patient not taking: Reported on 2023) 10.2 g 1       ROS:   A complete 12-point ROS was negative except as above.    EXAM:  Ht 1.753 m (5' 9\")   Wt 84.4 kg (186 lb)   LMP 2016 (LMP Unknown)   BMI 27.47 kg/m      General: appears comfortable, alert and interactive, in no acute distress  Head: normocephalic, atraumatic  Eyes: anicteric sclera, EOMI  Mouth: wearing mask per COVID-19 protocol  Neck: supple, no cervical adenopathy  CV: regular rate and rhythm, S1/S2, no murmur, gallop, rub, estimated JVP ~6 cm  Resp: clear to auscultation bilaterally, no rales or wheezing  GI: soft, nontender, nondistended, +BS  Extremities: warm, no peripheral edema, 2+ bilateral radial pulses  Neurological: normal speech and affect, no gross motor deficits  Psych: normal mood and affect  Derm: no rashes or lesions on exposed surfaces    Weight  Wt Readings from Last 10 " Encounters:   02/08/23 84.4 kg (186 lb)   02/07/23 84.4 kg (186 lb)   06/30/21 79.4 kg (175 lb)   03/04/20 79.4 kg (175 lb)   01/07/20 81.8 kg (180 lb 6.4 oz)   01/14/19 82.1 kg (181 lb)   01/07/19 82.3 kg (181 lb 8 oz)   09/25/17 78.3 kg (172 lb 11.2 oz)   09/08/16 79.9 kg (176 lb 1.6 oz)   03/10/15 74.5 kg (164 lb 3.2 oz)       I personally reviewed recent labs and data as below and discussed the results with the patient in clinic today.  Labs:  CBC RESULTS:  Lab Results   Component Value Date    WBC 2.9 (L) 02/15/2013    RBC 4.29 04/11/2012    HGB 13.6 04/11/2012    HCT 40.9 04/11/2012    MCV 95 04/11/2012    MCH 31.7 04/11/2012    MCHC 33.3 04/11/2012    RDW 12.6 04/11/2012     04/11/2012       CMP RESULTS:  Lab Results   Component Value Date     02/07/2023     01/07/2019    POTASSIUM 4.4 02/07/2023    POTASSIUM 4.4 01/07/2019    CHLORIDE 106 02/07/2023    CHLORIDE 107 01/07/2019    CO2 23 02/07/2023    CO2 25 01/07/2019    ANIONGAP 10 02/07/2023    ANIONGAP 7 01/07/2019    GLC 98 02/07/2023    GLC 98 01/07/2019    BUN 11.4 02/07/2023    BUN 13 01/07/2019    CR 0.68 02/07/2023    CR 0.67 01/07/2019    GFRESTIMATED >90 02/07/2023    GFRESTIMATED >90 01/07/2019    GFRESTBLACK >90 01/07/2019    KAREN 9.2 02/07/2023    KAREN 8.7 01/07/2019    BILITOTAL 0.4 02/07/2023    BILITOTAL 0.4 01/07/2019    ALBUMIN 4.5 02/07/2023    ALBUMIN 3.7 01/07/2019    ALKPHOS 79 02/07/2023    ALKPHOS 71 01/07/2019    ALT 14 02/07/2023    ALT 22 01/07/2019    AST 21 02/07/2023    AST 14 01/07/2019      Recent Labs   Lab Test 02/07/23  0904 01/07/19  0922   CHOL 230* 216*   HDL 71 78   * 123*   TRIG 58 77       Testing/Procedures:  I personally visualized and interpreted:  EKG 2/8/23 shows NSR with no acute ST-T changes    Outside results of note:  Outside records from PCP visits were obtained, and relevant results/notes have been incorporated into HPI.    Assessment and Plan:     In summary, 52 year old female with  a past medical history of mild persistent asthma and HLD who presents for evaluation of palpitations and subacute onset of ALEJANDRO.    # Palpitations  Occurring only at night while laying down, usually can occur almost nightly, but last <10 seconds at a time. Family history of Afib. EKG today shows NSR.  - Ordered 7-day ziopatch    # ALEJANDRO  Noticed this over the last few months, no issues with this previously. Now feeling more SOB while walking up a flight of stairs or her usual cross-country skiing. Patient does have family history of CHF in her father. No evidence of volume-overload on exam today.  - Ordered TTE to assess further    # HLD  LDL Feb. '23.  - Discussed importance of heart healthy diet and regular exercise, but overall impact might not be enough  - Will continue to monitor for now    # Overweight  Body mass index is 27.47 kg/m .   - Discussed diet and exercise as above    To Do:  - 7-day ziopatch for palpitations  - Ordered TTE for ALEJANDRO  - Follow up in 2 months with lipid panel and HbA1c before    The patient states understanding and is agreeable with plan.   Feel free to contact myself regarding questions or concerns. It was a pleasure to see this patient today.    I spent 45 minutes in care of the patient today including obtaining recent medical history, personally reviewing recent cardiac testing and/or lab results, today's examination, discussion of testing results and care recommendations with patient.    Liza Cleaning MD   of Medicine, [facility]  Advanced Heart Failure and Transplant Cardiology     STEPAN GRAVES

## 2023-02-08 NOTE — PROGRESS NOTES
Per Dr. Cleaning, patient to have 7-day zio monitor placed.  Diagnosis: palpitations  Monitor placed: Yes  Patient Instructed: Yes  Patient verbalized understanding: Yes  Holter # [id number]    Monitor was placed by BEAU Santiago   9:14 AM

## 2023-02-08 NOTE — NURSING NOTE
Labs: Patient was given results of the laboratory testing obtained today and patient was instructed about when to return for the next laboratory testing. [id number] and lipid panel in 2 months.     Med Reconcile: Reviewed and verified all current medications with the patient. The updated medication list was printed and given to the patient. NO CHANGES    Return Appointment: Patient given instructions regarding scheduling next clinic visit. 7 day ziopatch placed today. Echocardiogram. RTC in 2 months with fasting labs prior.     Patient stated she understood all health information given and agreed to call with further questions or concerns.     Yara Kern RN

## 2023-02-08 NOTE — LETTER
2/8/2023      RE: Noemi Chi  [address]  [facility] [id number]       Dear Colleague,    Thank you for the opportunity to participate in the care of your patient, Noemi Chi, at the [facility] [place] [facility]. Please see a copy of my visit note below.    Cardiology Clinic Note    HPI  Dear colleagues,     I had the pleasure of seeing Ms. Noemi Chi in the Cardiology clinic.  As you know, Ms. Noemi Chi is a pleasant 52 year old female with a past medical history of mild persistent asthma and HLD who presents for evaluation of palpitations. She was seen by her PCP for this yesterday and a cardiac monitor was ordered, but has not been placed yet.    Patient reports she occasionally feels palpitations when she is going to sleep. She denies having these when up and moving. There is no associated pain or shortness of breath with this. She is a cross country skier, and sometimes has a little more shortness of breath with certain activities like skiing and climbing the stairs in the last few months, but is still able to get through all her usual activities without difficulties. Patient has not changed her caffeine use recently, and does drink ETOH socially, but usually 1-2 at a time, and not every day. She denies any recent change in diet. She has gained weight in the last year despite no change in diet or exercise. She denies presyncope, syncope, chest pain, pressure, SOB, abdominal pain, nausea, vomiting, orthopnea, PND, LE edema, or weakness. Patient notes her cholesterol was also more elevated recently.    PAST MEDICAL HISTORY:  Patient Active Problem List   Diagnosis     Mild intermittent asthma     CARDIOVASCULAR SCREENING; LDL GOAL LESS THAN 160     Chronic rhinitis        FAMILY HISTORY:  Family History   Problem Relation Age of Onset     Hypertension Mother      Gynecology Mother      Lipids Mother       "CATINA Father         bypass surgery     Heart Disease Father      Pacemaker Father      Myocardial Infarction Father      No Known Problems Brother      Hypertension Maternal Grandmother      IVONNEAROWENA Maternal Grandmother         anureysm/ ruptured aortic     Hypertension Maternal Grandfather      Parkinsonism Paternal Aunt      Breast Cancer No family hx of      Colon Cancer No family hx of    Father with history of CAD and CABG now with CHF and Afib.    SOCIAL HISTORY:  Social History     Tobacco Use     Smoking status: Former     Years: 1.00     Types: Cigarettes     Quit date: 3/31/2005     Years since quittin.8     Smokeless tobacco: Never   Vaping Use     Vaping Use: Never used   Substance Use Topics     Alcohol use: Yes     Comment: 6/ week     Drug use: No        ALLERGIES:  Allergies   Allergen Reactions     Cats      Seasonal Allergies        CURRENT MEDICATIONS:  Current Outpatient Medications   Medication Sig Dispense Refill     albuterol (PROAIR HFA/PROVENTIL HFA/VENTOLIN HFA) 108 (90 Base) MCG/ACT inhaler INHALE 2 PUFFS INTO THE LUNGS EVERY 4 HOURS AS NEEDED FOR SHORTNESS OF BREATH OR DIFFICULT BREATHING OR WHEEZING 6.7 g 1     loratadine (CLARITIN) 10 MG capsule Take 10 mg by mouth as needed for allergies       Multiple Vitamin (MULTIVITAMINS PO) Take  by mouth.       budesonide-formoterol (SYMBICORT) 160-4.5 MCG/ACT Inhaler Inhale 2 puffs into the lungs 2 times daily (Patient not taking: Reported on 2023) 10.2 g 1       ROS:   A complete 12-point ROS was negative except as above.    EXAM:  Ht 1.753 m (5' 9\")   Wt 84.4 kg (186 lb)   LMP 2016 (LMP Unknown)   BMI 27.47 kg/m      General: appears comfortable, alert and interactive, in no acute distress  Head: normocephalic, atraumatic  Eyes: anicteric sclera, EOMI  Mouth: wearing mask per COVID-19 protocol  Neck: supple, no cervical adenopathy  CV: regular rate and rhythm, S1/S2, no murmur, gallop, rub, estimated JVP ~6 cm  Resp: clear " to auscultation bilaterally, no rales or wheezing  GI: soft, nontender, nondistended, +BS  Extremities: warm, no peripheral edema, 2+ bilateral radial pulses  Neurological: normal speech and affect, no gross motor deficits  Psych: normal mood and affect  Derm: no rashes or lesions on exposed surfaces    Weight  Wt Readings from Last 10 Encounters:   02/08/23 84.4 kg (186 lb)   02/07/23 84.4 kg (186 lb)   06/30/21 79.4 kg (175 lb)   03/04/20 79.4 kg (175 lb)   01/07/20 81.8 kg (180 lb 6.4 oz)   01/14/19 82.1 kg (181 lb)   01/07/19 82.3 kg (181 lb 8 oz)   09/25/17 78.3 kg (172 lb 11.2 oz)   09/08/16 79.9 kg (176 lb 1.6 oz)   03/10/15 74.5 kg (164 lb 3.2 oz)       I personally reviewed recent labs and data as below and discussed the results with the patient in clinic today.  Labs:  CBC RESULTS:  Lab Results   Component Value Date    WBC 2.9 (L) 02/15/2013    RBC 4.29 04/11/2012    HGB 13.6 04/11/2012    HCT 40.9 04/11/2012    MCV 95 04/11/2012    MCH 31.7 04/11/2012    MCHC 33.3 04/11/2012    RDW 12.6 04/11/2012     04/11/2012       CMP RESULTS:  Lab Results   Component Value Date     02/07/2023     01/07/2019    POTASSIUM 4.4 02/07/2023    POTASSIUM 4.4 01/07/2019    CHLORIDE 106 02/07/2023    CHLORIDE 107 01/07/2019    CO2 23 02/07/2023    CO2 25 01/07/2019    ANIONGAP 10 02/07/2023    ANIONGAP 7 01/07/2019    GLC 98 02/07/2023    GLC 98 01/07/2019    BUN 11.4 02/07/2023    BUN 13 01/07/2019    CR 0.68 02/07/2023    CR 0.67 01/07/2019    GFRESTIMATED >90 02/07/2023    GFRESTIMATED >90 01/07/2019    GFRESTBLACK >90 01/07/2019    KAREN 9.2 02/07/2023    KAREN 8.7 01/07/2019    BILITOTAL 0.4 02/07/2023    BILITOTAL 0.4 01/07/2019    ALBUMIN 4.5 02/07/2023    ALBUMIN 3.7 01/07/2019    ALKPHOS 79 02/07/2023    ALKPHOS 71 01/07/2019    ALT 14 02/07/2023    ALT 22 01/07/2019    AST 21 02/07/2023    AST 14 01/07/2019      Recent Labs   Lab Test 02/07/23  0904 01/07/19  0922   CHOL 230* 216*   HDL 71 78   LDL  147* 123*   TRIG 58 77       Testing/Procedures:  I personally visualized and interpreted:  EKG 2/8/23 shows NSR with no acute ST-T changes    Outside results of note:  Outside records from PCP visits were obtained, and relevant results/notes have been incorporated into HPI.    Assessment and Plan:     In summary, 52 year old female with a past medical history of mild persistent asthma and HLD who presents for evaluation of palpitations and subacute onset of ALEJANDRO.    # Palpitations  Occurring only at night while laying down, usually can occur almost nightly, but last <10 seconds at a time. Family history of Afib. EKG today shows NSR.  - Ordered 7-day ziopatch    # ALEJANDRO  Noticed this over the last few months, no issues with this previously. Now feeling more SOB while walking up a flight of stairs or her usual cross-country skiing. Patient does have family history of CHF in her father. No evidence of volume-overload on exam today.  - Ordered TTE to assess further    # HLD  LDL Feb. '23.  - Discussed importance of heart healthy diet and regular exercise, but overall impact might not be enough  - Will continue to monitor for now    # Overweight  Body mass index is 27.47 kg/m .   - Discussed diet and exercise as above    To Do:  - 7-day ziopatch for palpitations  - Ordered TTE for ALEJANDRO  - Follow up in 2 months with lipid panel and HbA1c before    The patient states understanding and is agreeable with plan.   Feel free to contact myself regarding questions or concerns. It was a pleasure to see this patient today.    I spent 45 minutes in care of the patient today including obtaining recent medical history, personally reviewing recent cardiac testing and/or lab results, today's examination, discussion of testing results and care recommendations with patient.    Liza Cleaning MD   of Medicine, [facility]  Advanced Heart Failure and Transplant Cardiology     STEPAN GRAVES

## 2023-02-10 LAB
ATRIAL RATE - MUSE: 69 BPM
DIASTOLIC BLOOD PRESSURE - MUSE: NORMAL MMHG
INTERPRETATION ECG - MUSE: NORMAL
P AXIS - MUSE: 29 DEGREES
PR INTERVAL - MUSE: 138 MS
QRS DURATION - MUSE: 88 MS
QT - MUSE: 388 MS
QTC - MUSE: 415 MS
R AXIS - MUSE: 47 DEGREES
SYSTOLIC BLOOD PRESSURE - MUSE: NORMAL MMHG
T AXIS - MUSE: 48 DEGREES
VENTRICULAR RATE- MUSE: 69 BPM

## 2023-02-17 ENCOUNTER — TELEPHONE (OUTPATIENT)
Dept: CARDIOLOGY | Facility: CLINIC | Age: 53
End: 2023-02-17

## 2023-04-15 ENCOUNTER — HEALTH MAINTENANCE LETTER (OUTPATIENT)
Age: 53
End: 2023-04-15

## 2023-04-28 ENCOUNTER — ANCILLARY PROCEDURE (OUTPATIENT)
Dept: CARDIOLOGY | Facility: CLINIC | Age: 53
End: 2023-04-28
Attending: STUDENT IN AN ORGANIZED HEALTH CARE EDUCATION/TRAINING PROGRAM
Payer: COMMERCIAL

## 2023-04-28 ENCOUNTER — LAB (OUTPATIENT)
Dept: LAB | Facility: CLINIC | Age: 53
End: 2023-04-28
Payer: COMMERCIAL

## 2023-04-28 VITALS
BODY MASS INDEX: 27.12 KG/M2 | HEIGHT: 69 IN | HEART RATE: 57 BPM | WEIGHT: 183.1 LBS | OXYGEN SATURATION: 97 % | DIASTOLIC BLOOD PRESSURE: 80 MMHG | SYSTOLIC BLOOD PRESSURE: 122 MMHG

## 2023-04-28 DIAGNOSIS — R00.2 PALPITATIONS: Primary | ICD-10-CM

## 2023-04-28 DIAGNOSIS — E78.5 HYPERLIPIDEMIA LDL GOAL <100: ICD-10-CM

## 2023-04-28 DIAGNOSIS — E66.3 OVERWEIGHT (BMI 25.0-29.9): ICD-10-CM

## 2023-04-28 DIAGNOSIS — Z13.6 CARDIOVASCULAR SCREENING; LDL GOAL LESS THAN 160: ICD-10-CM

## 2023-04-28 DIAGNOSIS — R00.2 PALPITATIONS: ICD-10-CM

## 2023-04-28 DIAGNOSIS — R53.81 PHYSICAL DECONDITIONING: ICD-10-CM

## 2023-04-28 LAB
CHOLEST SERPL-MCNC: 224 MG/DL
HBA1C MFR BLD: 5.5 %
HDLC SERPL-MCNC: 74 MG/DL
LDLC SERPL CALC-MCNC: 140 MG/DL
LVEF ECHO: NORMAL
NONHDLC SERPL-MCNC: 150 MG/DL
TRIGL SERPL-MCNC: 48 MG/DL

## 2023-04-28 PROCEDURE — 93306 TTE W/DOPPLER COMPLETE: CPT | Performed by: INTERNAL MEDICINE

## 2023-04-28 PROCEDURE — 36415 COLL VENOUS BLD VENIPUNCTURE: CPT | Performed by: PATHOLOGY

## 2023-04-28 PROCEDURE — 83036 HEMOGLOBIN GLYCOSYLATED A1C: CPT | Mod: 90 | Performed by: PATHOLOGY

## 2023-04-28 PROCEDURE — 99000 SPECIMEN HANDLING OFFICE-LAB: CPT | Performed by: PATHOLOGY

## 2023-04-28 PROCEDURE — 99214 OFFICE O/P EST MOD 30 MIN: CPT | Mod: 25 | Performed by: STUDENT IN AN ORGANIZED HEALTH CARE EDUCATION/TRAINING PROGRAM

## 2023-04-28 PROCEDURE — 80061 LIPID PANEL: CPT | Performed by: PATHOLOGY

## 2023-04-28 PROCEDURE — G0463 HOSPITAL OUTPT CLINIC VISIT: HCPCS | Performed by: STUDENT IN AN ORGANIZED HEALTH CARE EDUCATION/TRAINING PROGRAM

## 2023-04-28 ASSESSMENT — PAIN SCALES - GENERAL: PAINLEVEL: NO PAIN (0)

## 2023-04-28 NOTE — NURSING NOTE
Chief Complaint   Patient presents with     Follow Up     Return Cardiology visit: 52 year old with palpitations presents for follow up with labs and echo prior     Vitals were taken, medications reconciled.    Nora Varghese, EMT   9:36 AM

## 2023-04-28 NOTE — PROGRESS NOTES
Cardiology Clinic Note    HPI  Dear colleagues,     I had the pleasure of seeing Ms. Noemi Chi in the Cardiology clinic.  As you know, Ms. Noemi Chi is a pleasant 52 year old female with a past medical history of mild persistent asthma and HLD who presents for ongoing evaluation of palpitations.     Patient reports she occasionally feels palpitations when she is going to sleep. She denies having these when up and moving. There is no associated pain or shortness of breath with this. She is a cross country skier, and sometimes has a little more shortness of breath with certain activities like skiing and climbing the stairs over this past winter, but is still able to get through all her usual activities without difficulties. Patient has not changed her caffeine use recently, and does drink ETOH socially, but usually 1-2 at a time, and not every day. She denies any recent change in diet. She has gained weight in the last year despite no change in diet or exercise. She denies presyncope, syncope, chest pain, pressure, SOB, abdominal pain, nausea, vomiting, orthopnea, PND, LE edema, or weakness. Patient notes her cholesterol was also more elevated recently.    Patient reports no change in symptoms since her last appt with me and otherwise feels well recently.    PAST MEDICAL HISTORY:  Patient Active Problem List   Diagnosis     Mild intermittent asthma     CARDIOVASCULAR SCREENING; LDL GOAL LESS THAN 160     Chronic rhinitis        FAMILY HISTORY:  Family History   Problem Relation Age of Onset     Hypertension Mother      Gynecology Mother      Lipids Mother      C.A.D. Father         bypass surgery     Heart Disease Father      Pacemaker Father      Myocardial Infarction Father      No Known Problems Brother      Hypertension Maternal Grandmother      C.A.D. Maternal Grandmother         anureysm/ ruptured aortic     Hypertension Maternal Grandfather      Parkinsonism Paternal Aunt      Breast Cancer No  "family hx of      Colon Cancer No family hx of    Father with history of CAD and CABG now with CHF and Afib.    SOCIAL HISTORY:  Social History     Tobacco Use     Smoking status: Former     Years: 1.00     Types: Cigarettes     Quit date: 3/31/2005     Years since quittin.0     Smokeless tobacco: Never   Vaping Use     Vaping status: Never Used     Passive vaping exposure: Yes   Substance Use Topics     Alcohol use: Yes     Comment: 6/ week     Drug use: No        ALLERGIES:  Allergies   Allergen Reactions     Cats      Seasonal Allergies        CURRENT MEDICATIONS:  Current Outpatient Medications   Medication Sig Dispense Refill     albuterol (PROAIR HFA/PROVENTIL HFA/VENTOLIN HFA) 108 (90 Base) MCG/ACT inhaler INHALE 2 PUFFS INTO THE LUNGS EVERY 4 HOURS AS NEEDED FOR SHORTNESS OF BREATH OR DIFFICULT BREATHING OR WHEEZING 6.7 g 1     budesonide-formoterol (SYMBICORT) 160-4.5 MCG/ACT Inhaler Inhale 2 puffs into the lungs 2 times daily 10.2 g 1     loratadine (CLARITIN) 10 MG capsule Take 10 mg by mouth as needed for allergies       Multiple Vitamin (MULTIVITAMINS PO) Take  by mouth.         ROS:   A complete 12-point ROS was negative except as above.    EXAM:  /80 (BP Location: Right arm, Patient Position: Sitting, Cuff Size: Adult Regular)   Pulse 57   Ht 1.753 m (5' 9.02\")   Wt 83.1 kg (183 lb 1.6 oz)   LMP 2016 (LMP Unknown)   SpO2 97%   BMI 27.03 kg/m      General: appears comfortable, alert and interactive, in no acute distress  Head: normocephalic, atraumatic  Eyes: anicteric sclera, EOMI  Mouth: wearing mask per COVID-19 protocol  Neck: supple, no cervical adenopathy  CV: regular rate and rhythm, S1/S2, no murmur, gallop, rub, estimated JVP ~6 cm  Resp: clear to auscultation bilaterally, no rales or wheezing  GI: soft, nontender, nondistended  Extremities: warm, no peripheral edema, 2+ bilateral radial pulses  Neurological: normal speech and affect, no gross motor deficits  Psych: " normal mood and affect  Derm: no rashes or lesions on exposed surfaces    Weight  Wt Readings from Last 10 Encounters:   04/28/23 83.1 kg (183 lb 1.6 oz)   02/08/23 84.4 kg (186 lb)   02/07/23 84.4 kg (186 lb)   06/30/21 79.4 kg (175 lb)   03/04/20 79.4 kg (175 lb)   01/07/20 81.8 kg (180 lb 6.4 oz)   01/14/19 82.1 kg (181 lb)   01/07/19 82.3 kg (181 lb 8 oz)   09/25/17 78.3 kg (172 lb 11.2 oz)   09/08/16 79.9 kg (176 lb 1.6 oz)       I personally reviewed recent labs and data as below and discussed the results with the patient in clinic today.  Labs:  CBC RESULTS:  Lab Results   Component Value Date    WBC 2.9 (L) 02/15/2013    RBC 4.29 04/11/2012    HGB 13.6 04/11/2012    HCT 40.9 04/11/2012    MCV 95 04/11/2012    MCH 31.7 04/11/2012    MCHC 33.3 04/11/2012    RDW 12.6 04/11/2012     04/11/2012       CMP RESULTS:  Lab Results   Component Value Date     02/07/2023     01/07/2019    POTASSIUM 4.4 02/07/2023    POTASSIUM 4.4 01/07/2019    CHLORIDE 106 02/07/2023    CHLORIDE 107 01/07/2019    CO2 23 02/07/2023    CO2 25 01/07/2019    ANIONGAP 10 02/07/2023    ANIONGAP 7 01/07/2019    GLC 98 02/07/2023    GLC 98 01/07/2019    BUN 11.4 02/07/2023    BUN 13 01/07/2019    CR 0.68 02/07/2023    CR 0.67 01/07/2019    GFRESTIMATED >90 02/07/2023    GFRESTIMATED >90 01/07/2019    GFRESTBLACK >90 01/07/2019    KAREN 9.2 02/07/2023    KAREN 8.7 01/07/2019    BILITOTAL 0.4 02/07/2023    BILITOTAL 0.4 01/07/2019    ALBUMIN 4.5 02/07/2023    ALBUMIN 3.7 01/07/2019    ALKPHOS 79 02/07/2023    ALKPHOS 71 01/07/2019    ALT 14 02/07/2023    ALT 22 01/07/2019    AST 21 02/07/2023    AST 14 01/07/2019      Recent Labs   Lab Test 04/28/23  0815 02/07/23  0904   CHOL 224* 230*   HDL 74 71   * 147*   TRIG 48 58       Testing/Procedures:  I personally visualized and interpreted:  EKG 2/8/23 shows NSR with no acute ST-T changes    Cardiac Monitor 2/8/23      TTE 4/28/23  Interpretation Summary  Global and regional left  ventricular function is normal with an EF of 55-60%.  Right ventricular function, chamber size, wall motion, and thickness are  normal.  The inferior vena cava is normal.  No pericardial effusion is present.  There is no prior study for direct comparison.    Assessment and Plan:     In summary, 52 year old female with a past medical history of mild persistent asthma and HLD who presents for ongoing evaluation of palpitations and subacute onset of ALEJANDRO.    # Palpitations  Occurring only at night while laying down, usually can occur almost nightly, but last <10 seconds at a time. Family history of Afib. EKG today shows NSR.  - Ziopatch with only a couple short (6 beat) runs of SVT otherwise rare PACs and PVCs  - Discussed if these start occurring more frequently or lasting longer, could consider BB in the future, for now, will continue to monitor    # ALEJANDRO  Noticed this over the last few months, no issues with this previously. Now feeling more SOB while walking up a flight of stairs or her usual cross-country skiing. Patient does have family history of CHF in her father. No evidence of volume-overload on exam today.  - TTE is grossly unremarkable  - Most likely 2/2 winter deconditioning    # HLD   today.  - Discussed importance of heart healthy diet and regular exercise, but overall impact might not be enough, she would like to continue trying diet adjustment for now  - Recommend at least yearly monitoring lipid panel with PCP for now, if not improving, would start low-intensity statin with target LDL <100    # Overweight  Body mass index is 27.03 kg/m .   - Discussed diet and exercise as above    To Do:  - continue to monitor lipid panel with PCP, if LDL not improving with further diet modification, recommend starting statin  - Follow up PRN    The patient states understanding and is agreeable with plan.   Feel free to contact myself regarding questions or concerns. It was a pleasure to see this patient  today.    I spent 30 minutes in care of the patient today including obtaining recent medical history, personally reviewing recent cardiac testing and/or lab results, today's examination, discussion of testing results and care recommendations with patient.    Liza Cleaning MD   of Medicine, [facility]  Advanced Heart Failure and Transplant Cardiology       STEPAN FAN

## 2023-04-28 NOTE — LETTER
4/28/2023      RE: Noemi Chi  [address]  [facility] [address]       Dear Colleague,    Thank you for the opportunity to participate in the care of your patient, Noemi Chi, at the [facility] [place] [facility]. Please see a copy of my visit note below.    Cardiology Clinic Note    HPI  Dear colleagues,     I had the pleasure of seeing Ms. Noemi Chi in the Cardiology clinic.  As you know, Ms. Noemi Chi is a pleasant 52 year old female with a past medical history of mild persistent asthma and HLD who presents for ongoing evaluation of palpitations.     Patient reports she occasionally feels palpitations when she is going to sleep. She denies having these when up and moving. There is no associated pain or shortness of breath with this. She is a cross country skier, and sometimes has a little more shortness of breath with certain activities like skiing and climbing the stairs over this past winter, but is still able to get through all her usual activities without difficulties. Patient has not changed her caffeine use recently, and does drink ETOH socially, but usually 1-2 at a time, and not every day. She denies any recent change in diet. She has gained weight in the last year despite no change in diet or exercise. She denies presyncope, syncope, chest pain, pressure, SOB, abdominal pain, nausea, vomiting, orthopnea, PND, LE edema, or weakness. Patient notes her cholesterol was also more elevated recently.    Patient reports no change in symptoms since her last appt with me and otherwise feels well recently.    PAST MEDICAL HISTORY:  Patient Active Problem List   Diagnosis    Mild intermittent asthma    CARDIOVASCULAR SCREENING; LDL GOAL LESS THAN 160    Chronic rhinitis        FAMILY HISTORY:  Family History   Problem Relation Age of Onset    Hypertension Mother     Gynecology Mother     Lipids Mother      "CATINA Father         bypass surgery    Heart Disease Father     Pacemaker Father     Myocardial Infarction Father     No Known Problems Brother     Hypertension Maternal Grandmother     IVONNEABLAKE. Maternal Grandmother         anureysm/ ruptured aortic    Hypertension Maternal Grandfather     Parkinsonism Paternal Aunt     Breast Cancer No family hx of     Colon Cancer No family hx of    Father with history of CAD and CABG now with CHF and Afib.    SOCIAL HISTORY:  Social History     Tobacco Use    Smoking status: Former     Years: 1.00     Types: Cigarettes     Quit date: 3/31/2005     Years since quittin.0    Smokeless tobacco: Never   Vaping Use    Vaping status: Never Used     Passive vaping exposure: Yes   Substance Use Topics    Alcohol use: Yes     Comment: 6/ week    Drug use: No        ALLERGIES:  Allergies   Allergen Reactions    Cats     Seasonal Allergies        CURRENT MEDICATIONS:  Current Outpatient Medications   Medication Sig Dispense Refill    albuterol (PROAIR HFA/PROVENTIL HFA/VENTOLIN HFA) 108 (90 Base) MCG/ACT inhaler INHALE 2 PUFFS INTO THE LUNGS EVERY 4 HOURS AS NEEDED FOR SHORTNESS OF BREATH OR DIFFICULT BREATHING OR WHEEZING 6.7 g 1    budesonide-formoterol (SYMBICORT) 160-4.5 MCG/ACT Inhaler Inhale 2 puffs into the lungs 2 times daily 10.2 g 1    loratadine (CLARITIN) 10 MG capsule Take 10 mg by mouth as needed for allergies      Multiple Vitamin (MULTIVITAMINS PO) Take  by mouth.         ROS:   A complete 12-point ROS was negative except as above.    EXAM:  /80 (BP Location: Right arm, Patient Position: Sitting, Cuff Size: Adult Regular)   Pulse 57   Ht 1.753 m (5' 9.02\")   Wt 83.1 kg (183 lb 1.6 oz)   LMP 2016 (LMP Unknown)   SpO2 97%   BMI 27.03 kg/m      General: appears comfortable, alert and interactive, in no acute distress  Head: normocephalic, atraumatic  Eyes: anicteric sclera, EOMI  Mouth: wearing mask per COVID-19 protocol  Neck: supple, no cervical " adenopathy  CV: regular rate and rhythm, S1/S2, no murmur, gallop, rub, estimated JVP ~6 cm  Resp: clear to auscultation bilaterally, no rales or wheezing  GI: soft, nontender, nondistended  Extremities: warm, no peripheral edema, 2+ bilateral radial pulses  Neurological: normal speech and affect, no gross motor deficits  Psych: normal mood and affect  Derm: no rashes or lesions on exposed surfaces    Weight  Wt Readings from Last 10 Encounters:   04/28/23 83.1 kg (183 lb 1.6 oz)   02/08/23 84.4 kg (186 lb)   02/07/23 84.4 kg (186 lb)   06/30/21 79.4 kg (175 lb)   03/04/20 79.4 kg (175 lb)   01/07/20 81.8 kg (180 lb 6.4 oz)   01/14/19 82.1 kg (181 lb)   01/07/19 82.3 kg (181 lb 8 oz)   09/25/17 78.3 kg (172 lb 11.2 oz)   09/08/16 79.9 kg (176 lb 1.6 oz)       I personally reviewed recent labs and data as below and discussed the results with the patient in clinic today.  Labs:  CBC RESULTS:  Lab Results   Component Value Date    WBC 2.9 (L) 02/15/2013    RBC 4.29 04/11/2012    HGB 13.6 04/11/2012    HCT 40.9 04/11/2012    MCV 95 04/11/2012    MCH 31.7 04/11/2012    MCHC 33.3 04/11/2012    RDW 12.6 04/11/2012     04/11/2012       CMP RESULTS:  Lab Results   Component Value Date     02/07/2023     01/07/2019    POTASSIUM 4.4 02/07/2023    POTASSIUM 4.4 01/07/2019    CHLORIDE 106 02/07/2023    CHLORIDE 107 01/07/2019    CO2 23 02/07/2023    CO2 25 01/07/2019    ANIONGAP 10 02/07/2023    ANIONGAP 7 01/07/2019    GLC 98 02/07/2023    GLC 98 01/07/2019    BUN 11.4 02/07/2023    BUN 13 01/07/2019    CR 0.68 02/07/2023    CR 0.67 01/07/2019    GFRESTIMATED >90 02/07/2023    GFRESTIMATED >90 01/07/2019    GFRESTBLACK >90 01/07/2019    KAREN 9.2 02/07/2023    KAREN 8.7 01/07/2019    BILITOTAL 0.4 02/07/2023    BILITOTAL 0.4 01/07/2019    ALBUMIN 4.5 02/07/2023    ALBUMIN 3.7 01/07/2019    ALKPHOS 79 02/07/2023    ALKPHOS 71 01/07/2019    ALT 14 02/07/2023    ALT 22 01/07/2019    AST 21 02/07/2023    AST 14  01/07/2019      Recent Labs   Lab Test 04/28/23  0815 02/07/23  0904   CHOL 224* 230*   HDL 74 71   * 147*   TRIG 48 58       Testing/Procedures:  I personally visualized and interpreted:  EKG 2/8/23 shows NSR with no acute ST-T changes    Cardiac Monitor 2/8/23      TTE 4/28/23  Interpretation Summary  Global and regional left ventricular function is normal with an EF of 55-60%.  Right ventricular function, chamber size, wall motion, and thickness are  normal.  The inferior vena cava is normal.  No pericardial effusion is present.  There is no prior study for direct comparison.    Assessment and Plan:     In summary, 52 year old female with a past medical history of mild persistent asthma and HLD who presents for ongoing evaluation of palpitations and subacute onset of ALEJANDRO.    # Palpitations  Occurring only at night while laying down, usually can occur almost nightly, but last <10 seconds at a time. Family history of Afib. EKG today shows NSR.  - Ziopatch with only a couple short (6 beat) runs of SVT otherwise rare PACs and PVCs  - Discussed if these start occurring more frequently or lasting longer, could consider BB in the future, for now, will continue to monitor    # ALEJANDRO  Noticed this over the last few months, no issues with this previously. Now feeling more SOB while walking up a flight of stairs or her usual cross-country skiing. Patient does have family history of CHF in her father. No evidence of volume-overload on exam today.  - TTE is grossly unremarkable  - Most likely 2/2 winter deconditioning    # HLD   today.  - Discussed importance of heart healthy diet and regular exercise, but overall impact might not be enough, she would like to continue trying diet adjustment for now  - Recommend at least yearly monitoring lipid panel with PCP for now, if not improving, would start low-intensity statin with target LDL <100    # Overweight  Body mass index is 27.03 kg/m .   - Discussed diet and  exercise as above    To Do:  - continue to monitor lipid panel with PCP, if LDL not improving with further diet modification, recommend starting statin  - Follow up PRN    The patient states understanding and is agreeable with plan.   Feel free to contact myself regarding questions or concerns. It was a pleasure to see this patient today.    I spent 30 minutes in care of the patient today including obtaining recent medical history, personally reviewing recent cardiac testing and/or lab results, today's examination, discussion of testing results and care recommendations with patient.    Liza Cleaning MD   of Medicine, [facility]  Advanced Heart Failure and Transplant Cardiology     CC  STEPAN FAN        Please do not hesitate to contact me if you have any questions/concerns.     Sincerely,     Liza Cleaning MD

## 2023-04-28 NOTE — PATIENT INSTRUCTIONS
Cardiology Providers you saw during your visit:  Dr. Cleaning     Medication changes:  1-         Follow up:  1-          Follow the American Heart Association Diet and Lifestyle recommendations:  Limit saturated fat, trans fat, sodium, red meat, sweets and sugar-sweetened beverages. If you choose to eat red meat, compare labels and select the leanest cuts available.  Aim for at least 150 minutes of moderate physical activity or 75 minutes of vigorous physical activity - or an equal combination of both - each week.     During business hours: [phone], press option # 1 to schedule an appointment or send a message to your care team     After hours, weekends or holidays: On Call Cardiologist- [phone]   option #4 and ask to speak to the on-call Cardiologist. Inform them you are a CORE/heart failure patient at the [facility].     Yara Kern RN BSN CHFN  Cardiology Nurse Care Coordinator (Heart Failure / C.O.R.E.)

## 2023-05-04 ENCOUNTER — HOSPITAL ENCOUNTER (OUTPATIENT)
Facility: AMBULATORY SURGERY CENTER | Age: 53
End: 2023-05-04
Attending: INTERNAL MEDICINE | Admitting: INTERNAL MEDICINE
Payer: COMMERCIAL

## 2023-05-04 ENCOUNTER — TELEPHONE (OUTPATIENT)
Dept: GASTROENTEROLOGY | Facility: CLINIC | Age: 53
End: 2023-05-04
Payer: COMMERCIAL

## 2023-05-11 ENCOUNTER — TELEPHONE (OUTPATIENT)
Dept: GASTROENTEROLOGY | Facility: CLINIC | Age: 53
End: 2023-05-11
Payer: COMMERCIAL

## 2023-05-11 NOTE — TELEPHONE ENCOUNTER
Caller: Noemi Chi  Reason for Reschedule/Cancellation (please be detailed, any staff messages or encounters to note?): pt will be out of town needs to reschedule.      Prior to reschedule please review:    Ordering Provider:liat blankenship    Sedation per order:moderate    Does patient have any ASC Exclusions, please identify?: NO      Notes on Cancelled Procedure:    Procedure:Lower Endoscopy [Colonoscopy]     Date: 8/7/23    [facility]; [address] [place] [address], [address] [place] [address]    Surgeon: nathan        Rescheduled: Yes    Procedure: Lower Endoscopy [Colonoscopy]     Date: 8/14/23    Location:[facility]; [address] [place] [address], [address] [place] [address]    Surgeon: nathan    Sedation Level Scheduled  moderate,  Reason for Sedation Level per order    Prep/Instructions updated and sent: yes/my chart

## 2023-07-31 ENCOUNTER — TELEPHONE (OUTPATIENT)
Dept: GASTROENTEROLOGY | Facility: CLINIC | Age: 53
End: 2023-07-31
Payer: COMMERCIAL

## 2023-07-31 NOTE — TELEPHONE ENCOUNTER
Pre assessment completed for upcoming procedure.   (Please see previous telephone encounter notes for complete details)    Patient  returned call.       Procedure details:    Arrival time and facility location reviewed    Pre op exam needed? N/A    Designated  policy reviewed. Instructed to have someone stay 6 hours post procedure.     COVID policy reviewed.      Medication review:    Medications reviewed. Please see supporting documentation below. Holding recommendations discussed (if applicable).       Prep for procedure:     Writer reviewed when the dietary changes take place from the procedure prep instructions.Pt declined writer review the remaining portion of the instructions. Pt will call with any questions.      Additional information needed?  Pt take Ibuprofen PRN. Instructed to hold x 1 day prior      Patient  verbalized understanding and had no questions or concerns at this time.      Wendie Salgado RN  Endoscopy Procedure Pre Assessment RN  [phone] option 4

## 2023-07-31 NOTE — TELEPHONE ENCOUNTER
Attempted to contact patient in order to complete pre assessment questions.     No answer. Left message to return call to [phone] option 4      Procedure details:    Patient scheduled for Colonoscopy  on 8/14/23.     Arrival time: 0730. Procedure time 0830    Pre op exam needed? N/A    [facility]; [address] [place] [address], [address] Floor, [place] [address]    Sedation type: Conscious sedation     Indication for procedure: screening      Chart review:     Electronic implanted devices? No    Diabetic? No      Medication review:    Anticoagulants? No    NSAIDS? No NSAID medications per patient's medication list.  RN will verify with pre-assessment call.    Other medication HOLDING recommendations:  N/A      Prep for procedure:     Bowel prep recommendation: Miralax prep without magnesium citrate   Due to: standard bowel prep.    Prep instructions sent via [facility].       Noemi Proctor RN  Endoscopy Procedure Pre Assessment RN

## 2023-08-08 NOTE — TELEPHONE ENCOUNTER
Case moved from [facility] to [facility]     Arrival time unchanged.     Nelsy Wang RN  Endoscopy Procedure Pre Assessment RN  [phone] option 4

## 2023-08-14 ENCOUNTER — HOSPITAL ENCOUNTER (OUTPATIENT)
Facility: CLINIC | Age: 53
Discharge: HOME | End: 2023-08-14
Attending: INTERNAL MEDICINE | Admitting: INTERNAL MEDICINE
Payer: COMMERCIAL

## 2023-08-14 VITALS
OXYGEN SATURATION: 98 % | SYSTOLIC BLOOD PRESSURE: 107 MMHG | DIASTOLIC BLOOD PRESSURE: 76 MMHG | HEART RATE: 56 BPM | RESPIRATION RATE: 20 BRPM

## 2023-08-14 LAB — COLONOSCOPY: NORMAL

## 2023-08-14 PROCEDURE — G0121 COLON CA SCRN NOT HI RSK IND: HCPCS | Performed by: INTERNAL MEDICINE

## 2023-08-14 PROCEDURE — 250N000011 HC RX IP 250 OP 636: Performed by: INTERNAL MEDICINE

## 2023-08-14 PROCEDURE — G0500 MOD SEDAT ENDO SERVICE >5YRS: HCPCS | Performed by: INTERNAL MEDICINE

## 2023-08-14 PROCEDURE — 45378 DIAGNOSTIC COLONOSCOPY: CPT | Performed by: INTERNAL MEDICINE

## 2023-08-14 RX ORDER — ONDANSETRON 2 MG/ML
4 INJECTION INTRAMUSCULAR; INTRAVENOUS EVERY 6 HOURS PRN
Status: DISCONTINUED | OUTPATIENT
Start: 2023-08-14 | End: 2023-08-14 | Stop reason: HOSPADM

## 2023-08-14 RX ORDER — FENTANYL CITRATE 50 UG/ML
INJECTION, SOLUTION INTRAMUSCULAR; INTRAVENOUS PRN
Status: DISCONTINUED | OUTPATIENT
Start: 2023-08-14 | End: 2023-08-14 | Stop reason: HOSPADM

## 2023-08-14 RX ORDER — LIDOCAINE 40 MG/G
CREAM TOPICAL
Status: DISCONTINUED | OUTPATIENT
Start: 2023-08-14 | End: 2023-08-14 | Stop reason: HOSPADM

## 2023-08-14 RX ORDER — ONDANSETRON 4 MG/1
4 TABLET, ORALLY DISINTEGRATING ORAL EVERY 6 HOURS PRN
Status: DISCONTINUED | OUTPATIENT
Start: 2023-08-14 | End: 2023-08-14 | Stop reason: HOSPADM

## 2023-08-14 RX ORDER — NALOXONE HYDROCHLORIDE 0.4 MG/ML
0.4 INJECTION, SOLUTION INTRAMUSCULAR; INTRAVENOUS; SUBCUTANEOUS
Status: DISCONTINUED | OUTPATIENT
Start: 2023-08-14 | End: 2023-08-14 | Stop reason: HOSPADM

## 2023-08-14 RX ORDER — PROCHLORPERAZINE MALEATE 5 MG
10 TABLET ORAL EVERY 6 HOURS PRN
Status: CANCELLED | OUTPATIENT
Start: 2023-08-14

## 2023-08-14 RX ORDER — NALOXONE HYDROCHLORIDE 0.4 MG/ML
0.2 INJECTION, SOLUTION INTRAMUSCULAR; INTRAVENOUS; SUBCUTANEOUS
Status: DISCONTINUED | OUTPATIENT
Start: 2023-08-14 | End: 2023-08-14 | Stop reason: HOSPADM

## 2023-08-14 RX ORDER — ONDANSETRON 4 MG/1
4 TABLET, ORALLY DISINTEGRATING ORAL EVERY 6 HOURS PRN
Status: CANCELLED | OUTPATIENT
Start: 2023-08-14

## 2023-08-14 RX ORDER — FLUMAZENIL 0.1 MG/ML
0.2 INJECTION, SOLUTION INTRAVENOUS
Status: CANCELLED | OUTPATIENT
Start: 2023-08-14 | End: 2023-08-14

## 2023-08-14 RX ORDER — FLUMAZENIL 0.1 MG/ML
0.2 INJECTION, SOLUTION INTRAVENOUS
Status: DISCONTINUED | OUTPATIENT
Start: 2023-08-14 | End: 2023-08-14 | Stop reason: HOSPADM

## 2023-08-14 RX ORDER — PROCHLORPERAZINE MALEATE 5 MG
10 TABLET ORAL EVERY 6 HOURS PRN
Status: DISCONTINUED | OUTPATIENT
Start: 2023-08-14 | End: 2023-08-14 | Stop reason: HOSPADM

## 2023-08-14 RX ORDER — ONDANSETRON 2 MG/ML
4 INJECTION INTRAMUSCULAR; INTRAVENOUS
Status: DISCONTINUED | OUTPATIENT
Start: 2023-08-14 | End: 2023-08-14 | Stop reason: HOSPADM

## 2023-08-14 RX ORDER — ONDANSETRON 2 MG/ML
4 INJECTION INTRAMUSCULAR; INTRAVENOUS EVERY 6 HOURS PRN
Status: CANCELLED | OUTPATIENT
Start: 2023-08-14

## 2023-08-14 ASSESSMENT — ACTIVITIES OF DAILY LIVING (ADL): ADLS_ACUITY_SCORE: 35

## 2023-08-14 NOTE — OR NURSING
Pt tolerated colonoscopy for screening, very well, under conscious sedation. No samples were taken

## 2023-08-14 NOTE — H&P
Noemi Chi  [id number]  female  53 year old      Reason for procedure/surgery: colon cancer screening    Patient Active Problem List   Diagnosis    Mild intermittent asthma    CARDIOVASCULAR SCREENING; LDL GOAL LESS THAN 160    Chronic rhinitis       Past Surgical History:    Past Surgical History:   Procedure Laterality Date    HC TOOTH EXTRACTION W/FORCEP      Cleves teeth       Past Medical History:   Past Medical History:   Diagnosis Date    Mild intermittent asthma     EIA    Varicella without mention of complication        Social History:   Social History     Tobacco Use    Smoking status: Former     Years: 1.00     Types: Cigarettes     Quit date: 3/31/2005     Years since quittin.3    Smokeless tobacco: Never   Substance Use Topics    Alcohol use: Yes     Comment: 6/ week       Family History:   Family History   Problem Relation Age of Onset    Hypertension Mother     Gynecology Mother     Lipids Mother     C.A.D. Father         bypass surgery    Heart Disease Father     Pacemaker Father     Myocardial Infarction Father     No Known Problems Brother     Hypertension Maternal Grandmother     C.A.D. Maternal Grandmother         anureysm/ ruptured aortic    Hypertension Maternal Grandfather     Parkinsonism Paternal Aunt     Breast Cancer No family hx of     Colon Cancer No family hx of        Allergies:   Allergies   Allergen Reactions    Cats     Seasonal Allergies        Active Medications:   No current outpatient medications on file.       Systemic Review:   CONSTITUTIONAL: NEGATIVE for fever, chills, change in weight  ENT/MOUTH: NEGATIVE for ear, mouth and throat problems  RESP: NEGATIVE for significant cough or SOB  CV: NEGATIVE for chest pain, palpitations or peripheral edema    Physical Examination:   Vital Signs: /71   Pulse 66   Resp 16   LMP 2016 (LMP Unknown)   SpO2 99%   GENERAL: healthy, alert and no distress  NECK: no adenopathy, no asymmetry, masses, or  scars  RESP: lungs clear to auscultation - no rales, rhonchi or wheezes  CV: regular rate and rhythm, normal S1 S2, no S3 or S4, no murmur, click or rub, no peripheral edema and peripheral pulses strong  ABDOMEN: soft, nontender, no hepatosplenomegaly, no masses and bowel sounds normal  MS: no gross musculoskeletal defects noted, no edema    ASA Classification: (II)  Mild systemic disease  Airway Exam: Mallampati Score: Class II (Complete visualization of uvula)    Plan: Appropriate to proceed as scheduled.      Varghese Oglesby MD  8/14/2023    PCP:  Linda Bennett

## 2024-01-08 ENCOUNTER — PATIENT OUTREACH (OUTPATIENT)
Dept: CARE COORDINATION | Facility: CLINIC | Age: 54
End: 2024-01-08
Payer: COMMERCIAL

## 2024-01-22 ENCOUNTER — PATIENT OUTREACH (OUTPATIENT)
Dept: CARE COORDINATION | Facility: CLINIC | Age: 54
End: 2024-01-22
Payer: COMMERCIAL

## 2024-02-05 SDOH — HEALTH STABILITY: PHYSICAL HEALTH: ON AVERAGE, HOW MANY DAYS PER WEEK DO YOU ENGAGE IN MODERATE TO STRENUOUS EXERCISE (LIKE A BRISK WALK)?: 6 DAYS

## 2024-02-05 SDOH — HEALTH STABILITY: PHYSICAL HEALTH: ON AVERAGE, HOW MANY MINUTES DO YOU ENGAGE IN EXERCISE AT THIS LEVEL?: 40 MIN

## 2024-02-05 ASSESSMENT — ASTHMA QUESTIONNAIRES
QUESTION_4 LAST FOUR WEEKS HOW OFTEN HAVE YOU USED YOUR RESCUE INHALER OR NEBULIZER MEDICATION (SUCH AS ALBUTEROL): ONCE A WEEK OR LESS
ACT_TOTALSCORE: 21
QUESTION_3 LAST FOUR WEEKS HOW OFTEN DID YOUR ASTHMA SYMPTOMS (WHEEZING, COUGHING, SHORTNESS OF BREATH, CHEST TIGHTNESS OR PAIN) WAKE YOU UP AT NIGHT OR EARLIER THAN USUAL IN THE MORNING: NOT AT ALL
QUESTION_2 LAST FOUR WEEKS HOW OFTEN HAVE YOU HAD SHORTNESS OF BREATH: ONCE OR TWICE A WEEK
ACT_TOTALSCORE: 21
QUESTION_1 LAST FOUR WEEKS HOW MUCH OF THE TIME DID YOUR ASTHMA KEEP YOU FROM GETTING AS MUCH DONE AT WORK, SCHOOL OR AT HOME: A LITTLE OF THE TIME
QUESTION_5 LAST FOUR WEEKS HOW WOULD YOU RATE YOUR ASTHMA CONTROL: WELL CONTROLLED

## 2024-02-05 ASSESSMENT — SOCIAL DETERMINANTS OF HEALTH (SDOH): HOW OFTEN DO YOU GET TOGETHER WITH FRIENDS OR RELATIVES?: MORE THAN THREE TIMES A WEEK

## 2024-02-12 ENCOUNTER — ANCILLARY PROCEDURE (OUTPATIENT)
Dept: MAMMOGRAPHY | Facility: CLINIC | Age: 54
End: 2024-02-12
Attending: NURSE PRACTITIONER
Payer: COMMERCIAL

## 2024-02-12 ENCOUNTER — OFFICE VISIT (OUTPATIENT)
Dept: FAMILY MEDICINE | Facility: CLINIC | Age: 54
End: 2024-02-12
Payer: COMMERCIAL

## 2024-02-12 VITALS
BODY MASS INDEX: 28.79 KG/M2 | OXYGEN SATURATION: 99 % | RESPIRATION RATE: 15 BRPM | HEIGHT: 68 IN | SYSTOLIC BLOOD PRESSURE: 128 MMHG | TEMPERATURE: 98.1 F | DIASTOLIC BLOOD PRESSURE: 80 MMHG | HEART RATE: 63 BPM | WEIGHT: 190 LBS

## 2024-02-12 DIAGNOSIS — Z13.220 SCREENING CHOLESTEROL LEVEL: ICD-10-CM

## 2024-02-12 DIAGNOSIS — Z12.31 VISIT FOR SCREENING MAMMOGRAM: ICD-10-CM

## 2024-02-12 DIAGNOSIS — Z78.0 POST-MENOPAUSE: ICD-10-CM

## 2024-02-12 DIAGNOSIS — Z00.00 ROUTINE HISTORY AND PHYSICAL EXAMINATION OF ADULT: Primary | ICD-10-CM

## 2024-02-12 LAB
ALBUMIN SERPL BCG-MCNC: 4.4 G/DL (ref 3.5–5.2)
ALP SERPL-CCNC: 76 U/L (ref 40–150)
ALT SERPL W P-5'-P-CCNC: 11 U/L (ref 0–50)
ANION GAP SERPL CALCULATED.3IONS-SCNC: 11 MMOL/L (ref 7–15)
AST SERPL W P-5'-P-CCNC: 20 U/L (ref 0–45)
BILIRUB SERPL-MCNC: 0.4 MG/DL
BUN SERPL-MCNC: 11.7 MG/DL (ref 6–20)
CALCIUM SERPL-MCNC: 9.2 MG/DL (ref 8.6–10)
CHLORIDE SERPL-SCNC: 102 MMOL/L (ref 98–107)
CHOLEST SERPL-MCNC: 237 MG/DL
CREAT SERPL-MCNC: 0.67 MG/DL (ref 0.51–0.95)
DEPRECATED HCO3 PLAS-SCNC: 24 MMOL/L (ref 22–29)
EGFRCR SERPLBLD CKD-EPI 2021: >90 ML/MIN/1.73M2
FASTING STATUS PATIENT QL REPORTED: YES
GLUCOSE SERPL-MCNC: 92 MG/DL (ref 70–99)
HDLC SERPL-MCNC: 77 MG/DL
LDLC SERPL CALC-MCNC: 148 MG/DL
NONHDLC SERPL-MCNC: 160 MG/DL
POTASSIUM SERPL-SCNC: 4.3 MMOL/L (ref 3.4–5.3)
PROT SERPL-MCNC: 6.8 G/DL (ref 6.4–8.3)
SODIUM SERPL-SCNC: 137 MMOL/L (ref 135–145)
TRIGL SERPL-MCNC: 58 MG/DL

## 2024-02-12 PROCEDURE — 36415 COLL VENOUS BLD VENIPUNCTURE: CPT | Performed by: NURSE PRACTITIONER

## 2024-02-12 PROCEDURE — 77067 SCR MAMMO BI INCL CAD: CPT

## 2024-02-12 PROCEDURE — 80053 COMPREHEN METABOLIC PANEL: CPT | Performed by: NURSE PRACTITIONER

## 2024-02-12 PROCEDURE — 80061 LIPID PANEL: CPT | Performed by: NURSE PRACTITIONER

## 2024-02-12 PROCEDURE — 77067 SCR MAMMO BI INCL CAD: CPT | Mod: 26 | Performed by: STUDENT IN AN ORGANIZED HEALTH CARE EDUCATION/TRAINING PROGRAM

## 2024-02-12 PROCEDURE — 99396 PREV VISIT EST AGE 40-64: CPT | Performed by: NURSE PRACTITIONER

## 2024-02-12 ASSESSMENT — PAIN SCALES - GENERAL: PAINLEVEL: NO PAIN (0)

## 2024-02-12 NOTE — PROGRESS NOTES
"Preventive Care Visit  [facility] PRIMARY CARE  KEEGAN Young CNP, Family Medicine  Feb 12, 2024    Assessment & Plan   Problem List Items Addressed This Visit    None  Visit Diagnoses       Routine history and physical examination of adult    -  Primary    Post-menopause        Screening cholesterol level        Relevant Orders    REVIEW OF HEALTH MAINTENANCE PROTOCOL ORDERS (Completed)    Lipid panel reflex to direct LDL Fasting    Comprehensive metabolic panel (BMP + Alb, Alk Phos, ALT, AST, Total. Bili, TP)           Noemi experienced menopause age 45-46; she has never been on hormone therapy; she is not interested in medications. She has had hot flashes in the past, but is not currently having bothersome symptoms.      BMI  Estimated body mass index is 28.7 kg/m  as calculated from the following:    Height as of this encounter: 1.733 m (5' 8.23\").    Weight as of this encounter: 86.2 kg (190 lb).       Counseling  Appropriate preventive services were discussed with this patient, including applicable screening as appropriate for fall prevention, nutrition, physical activity, Tobacco-use cessation, weight loss and cognition.  Checklist reviewing preventive services available has been given to the patient.  Reviewed patient's diet, addressing concerns and/or questions.   She is at risk for psychosocial distress and has been provided with information to reduce risk.     Subjective   Noemi is a 53 year old, presenting for the following:  Physical        2/12/2024     1:06 PM   Additional Questions   Roomed by Nahed ELLIS        Health Care Directive  Patient does not have a Health Care Directive or Living Will:     HPI        2/5/2024   General Health   How would you rate your overall physical health? Good   Feel stress (tense, anxious, or unable to sleep) Only a little   (!) STRESS CONCERN      2/5/2024   Nutrition   Three or more servings of calcium each day? Yes   Diet: I don't " know   How many servings of fruit and vegetables per day? (!) 2-3   How many sweetened beverages each day? 0-1         2024   Exercise   Days per week of moderate/strenous exercise 6 days   Average minutes spent exercising at this level 40 min         2024   Social Factors   Frequency of gathering with friends or relatives More than three times a week   Worry food won't last until get money to buy more No   Food not last or not have enough money for food? No   Do you have housing?  Yes   Are you worried about losing your housing? No   Lack of transportation? No   Unable to get utilities (heat,electricity)? No         2024   Fall Risk   Fallen 2 or more times in the past year? No   Trouble with walking or balance? No          2024   Dental   Dentist two times every year? Yes         2024   TB Screening   Were you born outside of [place]?  No         Today's PHQ-2 Score:       2024    12:58 PM   PHQ-2 (  [employer])   Q1: Little interest or pleasure in doing things 0   Q2: Feeling down, depressed or hopeless 0   PHQ-2 Score 0   Q1: Little interest or pleasure in doing things Not at all   Q2: Feeling down, depressed or hopeless Not at all   PHQ-2 Score 0           2024   Substance Use   Alcohol more than 3/day or more than 7/wk No   Do you use any other substances recreationally? No     Social History     Tobacco Use    Smoking status: Former     Years: 1     Types: Cigarettes     Quit date: 3/31/2005     Years since quittin.8    Smokeless tobacco: Never   Vaping Use    Vaping Use: Never used   Substance Use Topics    Alcohol use: Yes     Comment: 6/ week    Drug use: No           2024   Breast Cancer Screening   Family history of breast, colon, or ovarian cancer? No / Unknown      Mammogram Screening - Mammogram every 1-2 years updated in Health Maintenance based on mutual decision making        2024   STI Screening   New sexual partner(s) since last STI/HIV test? No     History of  "abnormal Pap smear: NO - age 30-65 PAP every 5 years with negative HPV co-testing recommended        Latest Ref Rng & Units 2020     9:00 AM 2020     8:32 AM 3/10/2015     4:30 PM   PAP / HPV   PAP (Historical)   NIL     HPV 16 DNA NEG^Negative Negative   Negative    HPV 18 DNA NEG^Negative Negative   Negative    Other HR HPV NEG^Negative Negative   Negative      The 10-year ASCVD risk score (Teto HINOJOSA, et al., 2019) is: 1.2%    Values used to calculate the score:      Age: 53 years      Sex: Female      Is Non- : No      Diabetic: No      Tobacco smoker: No      Systolic Blood Pressure: 128 mmHg      Is BP treated: No      HDL Cholesterol: 74 mg/dL      Total Cholesterol: 224 mg/dL    Fracture Risk Assessment Tool  Link to Frax Calculator  Use the information below to complete the Frax calculator  : 1970  Sex: female  Weight (kg): 86.2 kg (actual weight)  Height (cm): 173.3 cm  Previous Fragility Fracture:  No  History of parent with fractured hip:  No  Current Smoking:  No  Patient has been on glucocorticoids for more than 3 months (5mg/day or more): No  Rheumatoid Arthritis on Problem List:  No  Secondary Osteoporosis on Problem List:  No  Consumes 3 or more units of alcohol per day: No  -Regularly does weight bearing exercise  -Early menopause at age 46    Reviewed and updated as needed this visit by Provider                        Review of Systems  Constitutional, HEENT, cardiovascular, pulmonary, gi and gu systems are negative, except as otherwise noted.     Objective    Exam  /80 (BP Location: Right arm, Patient Position: Sitting, Cuff Size: Adult Regular)   Pulse 63   Temp 98.1  F (36.7  C) (Temporal)   Resp 15   Ht 1.733 m (5' 8.23\")   Wt 86.2 kg (190 lb)   LMP 2016 (LMP Unknown)   SpO2 99%   BMI 28.70 kg/m     Estimated body mass index is 28.7 kg/m  as calculated from the following:    Height as of this encounter: 1.733 m (5' 8.23\").    Weight as " of this encounter: 86.2 kg (190 lb).    Physical Exam  GENERAL: alert and no distress  EYES: Eyes grossly normal to inspection, PERRL and conjunctivae and sclerae normal  NECK: no adenopathy, no asymmetry, masses, or scars  RESP: lungs clear to auscultation - no rales, rhonchi or wheezes  CV: regular rate and rhythm, normal S1 S2, no S3 or S4, no murmur, click or rub, no peripheral edema  ABDOMEN: soft, nontender, no hepatosplenomegaly, no masses and bowel sounds normal  MS: no gross musculoskeletal defects noted, no edema  SKIN: no suspicious lesions or rashes  NEURO: Normal strength and tone, mentation intact and speech normal  PSYCH: mentation appears normal, affect normal/bright      Signed Electronically by: KEEGAN Young CNP

## 2024-05-09 ENCOUNTER — MYC REFILL (OUTPATIENT)
Dept: FAMILY MEDICINE | Facility: CLINIC | Age: 54
End: 2024-05-09
Payer: COMMERCIAL

## 2024-05-09 DIAGNOSIS — J45.21: ICD-10-CM

## 2024-05-09 DIAGNOSIS — J45.30 MILD PERSISTENT ASTHMA WITHOUT COMPLICATION: ICD-10-CM

## 2024-05-09 RX ORDER — ALBUTEROL SULFATE 90 UG/1
2 AEROSOL, METERED RESPIRATORY (INHALATION) EVERY 4 HOURS PRN
Qty: 6.7 G | Refills: 1 | Status: SHIPPED | OUTPATIENT
Start: 2024-05-09

## 2024-05-09 RX ORDER — BUDESONIDE AND FORMOTEROL FUMARATE DIHYDRATE 160; 4.5 UG/1; UG/1
2 AEROSOL RESPIRATORY (INHALATION) 2 TIMES DAILY
Qty: 10.2 G | Refills: 1 | Status: SHIPPED | OUTPATIENT
Start: 2024-05-09 | End: 2024-06-13

## 2024-05-10 ENCOUNTER — TELEPHONE (OUTPATIENT)
Dept: FAMILY MEDICINE | Facility: CLINIC | Age: 54
End: 2024-05-10

## 2024-05-13 NOTE — PATIENT INSTRUCTIONS
Cardiology Providers you saw during your visit:  Dr. Salazar     Medication changes:  1- No medication changes        Follow up:  1- 7 day ziopatch monitor placed today  2 - Echocardiogram   3 - Follow up with Dr Salazar in 2 months with fasting labs prior         Follow the American Heart Association Diet and Lifestyle recommendations:  Limit saturated fat, trans fat, sodium, red meat, sweets and sugar-sweetened beverages. If you choose to eat red meat, compare labels and select the leanest cuts available.  Aim for at least 150 minutes of moderate physical activity or 75 minutes of vigorous physical activity - or an equal combination of both - each week.     During business hours: [phone], press option # 1 to schedule an appointment or send a message to your care team     After hours, weekends or holidays: On Call Cardiologist- [phone]   option #4 and ask to speak to the on-call Cardiologist. Inform them you are a CORE/heart failure patient at the [facility].     Yara Kern RN BSN CHFN  Cardiology Nurse Care Coordinator (Heart Failure / C.O.R.E.)     None

## 2024-05-23 NOTE — TELEPHONE ENCOUNTER
Retail Pharmacy Prior Authorization Team   Phone: [phone]    PA Initiation    Medication: BUDESONIDE-FORMOTEROL FUMARATE 160-4.5 MCG/ACT IN AERO  Insurance Company: [employer] - Phone [phone] Fax [phone]  Pharmacy Filling the Rx: [id number] - [place], [place] - [address]  [address]  Filling Pharmacy Phone: [phone]  Filling Pharmacy Fax:    Start Date: 5/23/2024      Note: Due to record-high volumes, our turn-around time is taking longer than usual . We are currently 2 weeks behind in the pools.   We are working diligently to submit all requests in a timely manner and in the order they are received. Please only flag TRUE URGENT requests as high priority to the pool at this time.   If you have questions on status of PA's,  please send a note/message in the active PA encounter and send back to the [facility] PA pool [[id number]].    If you have questions about the turn-around time or about our process, please reach out to our supervisor Liza Proctor.   Thank you!   RPPA (Retail Pharmacy Prior Authorization) team

## 2024-06-11 NOTE — TELEPHONE ENCOUNTER
Retail Pharmacy Prior Authorization Team   Phone: [phone]    PRIOR AUTHORIZATION DENIED    Medication: BUDESONIDE-FORMOTEROL FUMARATE 160-4.5 MCG/ACT IN AERO  Insurance Company: [employer] - Phone [phone] Fax [phone]  Denial Date: 6/10/2024  Denial Reason(s): MUST TRY AND FAIL TWO PREFERRED ALTERNATIVES: BREO ELLIPTA, WIXELA, FLUTICASONE-SALMETEROL      Appeal Information:

## 2024-06-11 NOTE — TELEPHONE ENCOUNTER
Retail Pharmacy Prior Authorization Team   Phone: [phone]    PA DENIED  If the provider would like to appeal we will need a detailed   letter of medical necessity with clinical reasoning to start the process.   Please close the encounter when finished.   Thank you,  Samantha Andrew CPhT    PA Team

## 2024-06-13 DIAGNOSIS — J45.20 MILD INTERMITTENT ASTHMA, UNSPECIFIED WHETHER COMPLICATED: Primary | ICD-10-CM

## 2024-06-13 RX ORDER — FLUTICASONE FUROATE AND VILANTEROL 200; 25 UG/1; UG/1
1 POWDER RESPIRATORY (INHALATION) DAILY
Qty: 90 EACH | Refills: 3 | Status: SHIPPED | OUTPATIENT
Start: 2024-06-13

## 2025-01-13 ENCOUNTER — PATIENT OUTREACH (OUTPATIENT)
Dept: CARE COORDINATION | Facility: CLINIC | Age: 55
End: 2025-01-13
Payer: COMMERCIAL

## 2025-01-27 ENCOUNTER — PATIENT OUTREACH (OUTPATIENT)
Dept: CARE COORDINATION | Facility: CLINIC | Age: 55
End: 2025-01-27
Payer: COMMERCIAL

## 2025-03-29 ENCOUNTER — HEALTH MAINTENANCE LETTER (OUTPATIENT)
Age: 55
End: 2025-03-29

## 2025-04-28 SDOH — HEALTH STABILITY: PHYSICAL HEALTH: ON AVERAGE, HOW MANY DAYS PER WEEK DO YOU ENGAGE IN MODERATE TO STRENUOUS EXERCISE (LIKE A BRISK WALK)?: 5 DAYS

## 2025-04-28 SDOH — HEALTH STABILITY: PHYSICAL HEALTH: ON AVERAGE, HOW MANY MINUTES DO YOU ENGAGE IN EXERCISE AT THIS LEVEL?: 50 MIN

## 2025-04-28 ASSESSMENT — ASTHMA QUESTIONNAIRES
ACT_TOTALSCORE: 17
QUESTION_1 LAST FOUR WEEKS HOW MUCH OF THE TIME DID YOUR ASTHMA KEEP YOU FROM GETTING AS MUCH DONE AT WORK, SCHOOL OR AT HOME: A LITTLE OF THE TIME
QUESTION_5 LAST FOUR WEEKS HOW WOULD YOU RATE YOUR ASTHMA CONTROL: WELL CONTROLLED
QUESTION_4 LAST FOUR WEEKS HOW OFTEN HAVE YOU USED YOUR RESCUE INHALER OR NEBULIZER MEDICATION (SUCH AS ALBUTEROL): ONE OR TWO TIMES PER DAY
QUESTION_3 LAST FOUR WEEKS HOW OFTEN DID YOUR ASTHMA SYMPTOMS (WHEEZING, COUGHING, SHORTNESS OF BREATH, CHEST TIGHTNESS OR PAIN) WAKE YOU UP AT NIGHT OR EARLIER THAN USUAL IN THE MORNING: ONCE OR TWICE
QUESTION_2 LAST FOUR WEEKS HOW OFTEN HAVE YOU HAD SHORTNESS OF BREATH: THREE TO SIX TIMES A WEEK

## 2025-04-28 ASSESSMENT — SOCIAL DETERMINANTS OF HEALTH (SDOH): HOW OFTEN DO YOU GET TOGETHER WITH FRIENDS OR RELATIVES?: TWICE A WEEK

## 2025-04-30 ENCOUNTER — OFFICE VISIT (OUTPATIENT)
Dept: FAMILY MEDICINE | Facility: CLINIC | Age: 55
End: 2025-04-30
Payer: COMMERCIAL

## 2025-04-30 VITALS
OXYGEN SATURATION: 98 % | TEMPERATURE: 97.4 F | HEIGHT: 68 IN | WEIGHT: 189.5 LBS | HEART RATE: 65 BPM | RESPIRATION RATE: 27 BRPM | BODY MASS INDEX: 28.72 KG/M2 | SYSTOLIC BLOOD PRESSURE: 118 MMHG | DIASTOLIC BLOOD PRESSURE: 82 MMHG

## 2025-04-30 DIAGNOSIS — Z12.4 CERVICAL CANCER SCREENING: ICD-10-CM

## 2025-04-30 DIAGNOSIS — J45.20 MILD INTERMITTENT ASTHMA, UNSPECIFIED WHETHER COMPLICATED: ICD-10-CM

## 2025-04-30 DIAGNOSIS — Z00.00 ROUTINE HISTORY AND PHYSICAL EXAMINATION OF ADULT: Primary | ICD-10-CM

## 2025-04-30 DIAGNOSIS — J45.21: ICD-10-CM

## 2025-04-30 DIAGNOSIS — Z78.0 MENOPAUSE: ICD-10-CM

## 2025-04-30 DIAGNOSIS — Z13.220 SCREENING CHOLESTEROL LEVEL: ICD-10-CM

## 2025-04-30 LAB
CHOLEST SERPL-MCNC: 242 MG/DL
FASTING STATUS PATIENT QL REPORTED: YES
HDLC SERPL-MCNC: 73 MG/DL
LDLC SERPL CALC-MCNC: 158 MG/DL
NONHDLC SERPL-MCNC: 169 MG/DL
TRIGL SERPL-MCNC: 56 MG/DL

## 2025-04-30 PROCEDURE — 99214 OFFICE O/P EST MOD 30 MIN: CPT | Mod: 25 | Performed by: NURSE PRACTITIONER

## 2025-04-30 PROCEDURE — 3079F DIAST BP 80-89 MM HG: CPT | Performed by: NURSE PRACTITIONER

## 2025-04-30 PROCEDURE — 99459 PELVIC EXAMINATION: CPT | Performed by: NURSE PRACTITIONER

## 2025-04-30 PROCEDURE — 36415 COLL VENOUS BLD VENIPUNCTURE: CPT | Performed by: NURSE PRACTITIONER

## 2025-04-30 PROCEDURE — 80061 LIPID PANEL: CPT | Performed by: NURSE PRACTITIONER

## 2025-04-30 PROCEDURE — 87624 HPV HI-RISK TYP POOLED RSLT: CPT | Performed by: NURSE PRACTITIONER

## 2025-04-30 PROCEDURE — 3074F SYST BP LT 130 MM HG: CPT | Performed by: NURSE PRACTITIONER

## 2025-04-30 PROCEDURE — 99396 PREV VISIT EST AGE 40-64: CPT | Performed by: NURSE PRACTITIONER

## 2025-04-30 RX ORDER — ALBUTEROL SULFATE 90 UG/1
2 INHALANT RESPIRATORY (INHALATION) EVERY 4 HOURS PRN
Qty: 6.7 G | Refills: 1 | Status: SHIPPED | OUTPATIENT
Start: 2025-04-30

## 2025-04-30 NOTE — PROGRESS NOTES
Preventive Care Visit  [facility]  KEEGAN Young CNP, Family Medicine  Apr 30, 2025      ICD-10-CM    1. Routine history and physical examination of adult  Z00.00       2. Cervical cancer screening  Z12.4 HPV and Gynecologic Cytology Panel - Recommended Age 30 - 65 Years      3. Menopause  Z78.0 estrogen conj-medroxyPROGESTERone (PREMPRO) 0.3-1.5 MG tablet      4. Screening cholesterol level  Z13.220 Lipid panel reflex to direct LDL Fasting     Lipid panel reflex to direct LDL Fasting      5. Intrinsic asthma with acute exacerbation, mild intermittent  J45.21 albuterol (PROAIR HFA/PROVENTIL HFA/VENTOLIN HFA) 108 (90 Base) MCG/ACT inhaler      6. Mild intermittent asthma, unspecified whether complicated  J45.20         -asthma symptoms stable  -Discussed HRT; start today; follow up on HRT in 3 months  The longitudinal plan of care for the diagnosis(es)/condition(s) as documented were addressed during this visit. Due to the added complexity in care, I will continue to support Noemi in the subsequent management and with ongoing continuity of care.      Stefania Franklin is a 54 year old, presenting for the following:  Physical (PT IS FASTING )        4/30/2025     1:48 PM   Additional Questions   Roomed by Chela LOPEZ   Accompanied by self          HPI    Advance Care Planning    Discussed advance care planning with patient; informed AVS has link to [facility].    -Had last period about 7 years ago. Has been having hot flashes consistently for this whole time, maybe getting slightly better. Slight vaginal dryness. Has noticed weight gain, low energy, trouble with sleep.  Mood is stable. NO history of blood clots, high blood pressure. No FH of breast cancer. Took birth control several years and tolerated it well.   Asthma      4/28/2025    10:00 AM   ACT Total Scores   ACT TOTAL SCORE (Goal Greater than or Equal to 20) 17    In the past 12 months, how many times  did you visit the emergency room for your asthma without being admitted to the hospital? 0   In the past 12 months, how many times were you hospitalized overnight because of your asthma? 0       Patient-reported     Do you have any of the following symptoms? Trouble with breathing (shortness of breath)  What makes your asthma/breathing worse?  Pollens  Do you want more information about how to use your inhaler? No         4/28/2025   General Health   How would you rate your overall physical health? Good   Feel stress (tense, anxious, or unable to sleep) Only a little   (!) STRESS CONCERN      4/28/2025   Nutrition   Three or more servings of calcium each day? Yes   Diet: Regular (no restrictions)   How many servings of fruit and vegetables per day? (!) 2-3   How many sweetened beverages each day? 0-1         4/28/2025   Exercise   Days per week of moderate/strenous exercise 5 days   Average minutes spent exercising at this level 50 min         4/28/2025   Social Factors   Frequency of gathering with friends or relatives Twice a week   Worry food won't last until get money to buy more No   Food not last or not have enough money for food? No   Do you have housing? (Housing is defined as stable permanent housing and does not include staying outside in a car, in a tent, in an abandoned building, in an overnight shelter, or couch-surfing.) Yes   Are you worried about losing your housing? No   Lack of transportation? No   Unable to get utilities (heat,electricity)? No         4/28/2025   Fall Risk   Fallen 2 or more times in the past year? No   Trouble with walking or balance? No          4/28/2025   Dental   Dentist two times every year? Yes         Today's PHQ-2 Score:       4/30/2025     1:35 PM   PHQ-2 ( 1999 [employer])   Q1: Little interest or pleasure in doing things 0   Q2: Feeling down, depressed or hopeless 0   PHQ-2 Score 0    Q1: Little interest or pleasure in doing things Not at all   Q2: Feeling down, depressed  or hopeless Not at all   PHQ-2 Score 0       Patient-reported           2025   Substance Use   Alcohol more than 3/day or more than 7/wk No   Do you use any other substances recreationally? No     Social History     Tobacco Use    Smoking status: Former     Current packs/day: 0.00     Average packs/day: 0.5 packs/day for 22.0 years (11.0 ttl pk-yrs)     Types: Cigarettes     Start date: 3/31/1983     Quit date: 3/31/2005     Years since quittin.0    Smokeless tobacco: Never   Vaping Use    Vaping status: Never Used   Substance Use Topics    Alcohol use: Yes     Comment: 6/ week    Drug use: No           2024   LAST FHS-7 RESULTS   1st degree relative breast or ovarian cancer No   Any relative bilateral breast cancer No   Any male have breast cancer No   Any ONE woman have BOTH breast AND ovarian cancer No   Any woman with breast cancer before 50yrs No   2 or more relatives with breast AND/OR ovarian cancer No   2 or more relatives with breast AND/OR bowel cancer No        Mammogram Screening - Mammogram every 1-2 years updated in Health Maintenance based on mutual decision making        2025   STI Screening   New sexual partner(s) since last STI/HIV test? No     History of abnormal Pap smear: No - age 30-64 HPV with reflex Pap every 5 years recommended        Latest Ref Rng & Units 2020     9:00 AM 2020     8:32 AM 3/10/2015     4:30 PM   PAP / HPV   PAP (Historical)   NIL     HPV 16 DNA NEG^Negative Negative   Negative    HPV 18 DNA NEG^Negative Negative   Negative    Other HR HPV NEG^Negative Negative   Negative      ASCVD Risk   The 10-year ASCVD risk score (Teto HINOJOSA, et al., 2019) is: 1.2%    Values used to calculate the score:      Age: 54 years      Sex: Female      Is Non- : No      Diabetic: No      Tobacco smoker: No      Systolic Blood Pressure: 118 mmHg      Is BP treated: No      HDL Cholesterol: 77 mg/dL      Total Cholesterol: 237  "mg/dL         Reviewed and updated as needed this visit by Provider                        Review of Systems  Constitutional, neuro, ENT, endocrine, pulmonary, cardiac, gastrointestinal, genitourinary, musculoskeletal, integument and psychiatric systems are negative, except as otherwise noted.     Objective    Exam  /82 (BP Location: Right arm, Patient Position: Sitting, Cuff Size: Adult Regular)   Pulse 65   Temp 97.4  F (36.3  C) (Temporal)   Resp 27   Ht 1.727 m (5' 8\")   Wt 86 kg (189 lb 8 oz)   LMP 08/08/2016 (LMP Unknown)   SpO2 98%   BMI 28.81 kg/m     Estimated body mass index is 28.81 kg/m  as calculated from the following:    Height as of this encounter: 1.727 m (5' 8\").    Weight as of this encounter: 86 kg (189 lb 8 oz).    Physical Exam  GENERAL: alert and no distress  NECK: no adenopathy, no asymmetry, masses, or scars  RESP: lungs clear to auscultation - no rales, rhonchi or wheezes  BREAST: normal without masses, tenderness or nipple discharge and no palpable axillary masses or adenopathy  CV: regular rate and rhythm, normal S1 S2, no S3 or S4, no murmur, click or rub, no peripheral edema  ABDOMEN: soft, nontender, no hepatosplenomegaly, no masses and bowel sounds normal   (female) w/bimanual: normal female external genitalia, normal urethral meatus, normal vaginal mucosa, normal cervix/adnexa/uterus without masses or discharge  MS: no gross musculoskeletal defects noted, no edema  SKIN: no suspicious lesions or rashes  NEURO: Normal strength and tone, mentation intact and speech normal  PSYCH: mentation appears normal, affect normal/bright        Signed Electronically by: KEEGAN Young CNP    "

## 2025-05-01 LAB
HPV HR 12 DNA CVX QL NAA+PROBE: NEGATIVE
HPV16 DNA CVX QL NAA+PROBE: NEGATIVE
HPV18 DNA CVX QL NAA+PROBE: NEGATIVE
HUMAN PAPILLOMA VIRUS FINAL DIAGNOSIS: NORMAL

## 2025-05-05 LAB
BKR AP ASSOCIATED HPV REPORT: NORMAL
BKR LAB AP GYN ADEQUACY: NORMAL
BKR LAB AP GYN INTERPRETATION: NORMAL
BKR LAB AP LMP: NORMAL
BKR LAB AP PREVIOUS ABNORMAL: NORMAL
PATH REPORT.COMMENTS IMP SPEC: NORMAL
PATH REPORT.COMMENTS IMP SPEC: NORMAL
PATH REPORT.RELEVANT HX SPEC: NORMAL

## 2025-06-23 DIAGNOSIS — J45.21: ICD-10-CM

## 2025-06-23 RX ORDER — ALBUTEROL SULFATE 90 UG/1
2 INHALANT RESPIRATORY (INHALATION) EVERY 4 HOURS PRN
Qty: 6.7 G | Refills: 1 | Status: SHIPPED | OUTPATIENT
Start: 2025-06-23

## 2025-07-08 DIAGNOSIS — J45.20 MILD INTERMITTENT ASTHMA, UNSPECIFIED WHETHER COMPLICATED: Primary | ICD-10-CM

## 2025-07-08 NOTE — Clinical Note
Future Appointments 7/30/2025  5:00 PM    Linda Bennett APRN* [facility] 8/27/2025  9:30 AM     FATOU BANERJEE                   [facility] 8/27/2025  10:30 AM   Ruperto Segal* [facility]

## 2025-07-08 NOTE — PROGRESS NOTES
Chest x-ray and PFT orders have been placed per protocol.     Zabrina Wagner RN   [facility]

## 2025-07-23 NOTE — CONFIDENTIAL NOTE
RECORDS RECEIVED FROM: internal    DATE RECEIVED: 8/27/25    NOTES STATUS DETAILS   OFFICE NOTE from referring provider internal  Self referred    OFFICE NOTE from other specialist internal  4.30.25 [place]      MEDICATION LIST internal     IMAGING  (NEED IMAGES AND REPORTS)     CT SCAN In process     CHEST XRAY (CXR) In process     TESTS     PULMONARY FUNCTION TESTING (PFT) internal  Scheduled 8/27/25        Action 7.23.25 sv    Action Taken Called pt and lvm about gathering outside medical records

## 2025-07-26 ASSESSMENT — ASTHMA QUESTIONNAIRES: ACT_TOTALSCORE: 7

## 2025-07-30 ENCOUNTER — VIRTUAL VISIT (OUTPATIENT)
Dept: FAMILY MEDICINE | Facility: CLINIC | Age: 55
End: 2025-07-30
Payer: COMMERCIAL

## 2025-07-30 DIAGNOSIS — N95.1 PERIMENOPAUSE: ICD-10-CM

## 2025-07-30 DIAGNOSIS — J45.21 MILD INTERMITTENT ASTHMA WITH ACUTE EXACERBATION: Primary | ICD-10-CM

## 2025-07-30 PROCEDURE — 98006 SYNCH AUDIO-VIDEO EST MOD 30: CPT | Performed by: NURSE PRACTITIONER

## 2025-07-30 RX ORDER — MONTELUKAST SODIUM 10 MG/1
10 TABLET ORAL AT BEDTIME
Qty: 90 TABLET | Refills: 3 | Status: SHIPPED | OUTPATIENT
Start: 2025-07-30

## 2025-07-30 NOTE — PROGRESS NOTES
Noemi is a 55 year old who is being evaluated via a billable video visit.    How would you like to obtain your AVS? MyChart  If the video visit is dropped, the invitation should be resent by:   Will anyone else be joining your video visit? No        ICD-10-CM    1. Mild intermittent asthma with acute exacerbation  J45.21 montelukast (SINGULAIR) 10 MG tablet      2. Perimenopause  N95.1         -She plans to follow up with pulmonology  -HRT has been helpful; symptoms of hot flashes and insomnia much improved.    Subjective   Noemi is a 55 year old, presenting for the following health issues:  No chief complaint on file.    HPI          7/26/2025   Asthma   1.  In the past 4 weeks, how much of the time did your asthma keep you from getting as much done at work, school or at home? 2   2.  During the past 4 weeks, how often have you had shortness of breath? 1   3.  During the past 4 weeks, how often did your asthma symptoms (wheezing, coughing, shortness of breath, chest tightness or pain) wake you up at night or earlier than usual in the morning? 1   4.  During the past 4 weeks, how often have you used your rescue inhaler or nebulizer medication (such as albuterol)? 1   5.  How would you rate your asthma control during the past 4 weeks? 2   ACT TOTAL SCORE (Goal Greater than or Equal to 20) 7    In the past 12 months, how many times did you visit the emergency room for your asthma without being admitted to the hospital? 0   In the past 12 months, how many times were you hospitalized overnight because of your asthma? 0   Do you have a cough, wheezing or shortness of breath? (!) COUGH    (!) NOISY BREATHING (WHEEZING)    (!) TROUBLE WITH BREATHING (SHORTNESS OF BREATH)   What makes your asthma/breathing worse? Animal dander    Exercise or sports    Cold air   Do you want more information about how to use your inhaler? (!) YES       Patient-reported    Multiple values from one day are sorted in reverse-chronological  order             Review of Systems  Constitutional, HEENT, cardiovascular, pulmonary, gi and gu systems are negative, except as otherwise noted.      Objective           Vitals:  No vitals were obtained today due to virtual visit.    Physical Exam   GENERAL: alert and no distress  EYES: Eyes grossly normal to inspection.  No discharge or erythema, or obvious scleral/conjunctival abnormalities.  RESP: No audible wheeze, cough, or visible cyanosis.    SKIN: Visible skin clear. No significant rash, abnormal pigmentation or lesions.  NEURO: Cranial nerves grossly intact.  Mentation and speech appropriate for age.  PSYCH: Appropriate affect, tone, and pace of words    Office Visit on 04/30/2025   Component Date Value Ref Range Status    Human Papilloma Virus 16 DNA 04/30/2025 Negative  Negative Final    Human Papilloma Virus 18 DNA 04/30/2025 Negative  Negative Final    Human Papilloma Virus Other 04/30/2025 Negative  Negative Final    FINAL DIAGNOSIS 04/30/2025    Final                    Value:This patient's sample is negative for high risk HPV DNA.          METHODOLOGY: The [employer] system uses automated extraction, simultaneous amplification of HPV (E6/E7 oncogenes) and beta-globin, followed by real time detection of fluorescent labeled HPV and beta globin using specific oligonucleotide probes. The test specifically identifies types HPV 16 DNA and HPV 18 DNA while concurrently detecting the rest of the high risk types (31, 33, 35, 39, 45, 51, 52, 56, 58, 59, 66 or 68).     COMMENTS: This test is not intended for use as a screening device for woman under age 30 with normal cervical cytology. Results should be correlated with cytologic and histologic findings. Close clinical follow up is recommended.    Please see the separate Gynecologic Cytology (Pap) report from the same collection date.      Cholesterol 04/30/2025 242 (H)  <200 mg/dL Final    Triglycerides 04/30/2025 56  <150 mg/dL Final    Direct Measure HDL  04/30/2025 73  >=50 mg/dL Final    LDL Cholesterol Calculated 04/30/2025 158 (H)  <100 mg/dL Final    Non HDL Cholesterol 04/30/2025 169 (H)  <130 mg/dL Final    Patient Fasting > 8hrs? 04/30/2025 Yes   Final    Interpretation 04/30/2025 Negative for Intraepithelial Lesion or Malignancy (NILM)    Final    Comment 04/30/2025    Final                    Value:  Papanicolaou Test Limitations:  Cervical cytology is a screening test with limited sensitivity, and regular screening is critical for cancer prevention.  Pap tests are primarily effective for the diagnosis/prevention of squamous cell carcinoma, not adenocarcinoma or other cancers.        Specimen Adequacy 04/30/2025 Satisfactory for evaluation, endocervical/transformation zone component absent   Final    Clinical Information 04/30/2025    Final                    Value:none      LMP/Menopause Date 04/30/2025    Final                    Value:  Comment: 2018      Previous Abnormal? 04/30/2025    Final                    Value:No      Performing Labs 04/30/2025    Final                    Value:The technical component of this testing was completed at [facility].    Stain controls for all stains resulted within this report have been reviewed and show appropriate reactivity.      Associated HPV Report 04/30/2025    Final                    Value:Please see the associated HPV High Risk Types DNA Cervical report for Specimen [id number] from the same collection date.           Video-Visit Details    Type of service:  Video Visit   Originating Location (pt. Location): Home    Distant Location (provider location):  On-site  Platform used for Video Visit: Jennifer  Signed Electronically by: KEEGAN Yuong CNP

## 2025-08-22 ASSESSMENT — ASTHMA QUESTIONNAIRES
QUESTION_2 LAST FOUR WEEKS HOW OFTEN HAVE YOU HAD SHORTNESS OF BREATH: MORE THAN ONCE A DAY
QUESTION_3 LAST FOUR WEEKS HOW OFTEN DID YOUR ASTHMA SYMPTOMS (WHEEZING, COUGHING, SHORTNESS OF BREATH, CHEST TIGHTNESS OR PAIN) WAKE YOU UP AT NIGHT OR EARLIER THAN USUAL IN THE MORNING: TWO OR THREE NIGHTS A WEEK
QUESTION_1 LAST FOUR WEEKS HOW MUCH OF THE TIME DID YOUR ASTHMA KEEP YOU FROM GETTING AS MUCH DONE AT WORK, SCHOOL OR AT HOME: MOST OF THE TIME
QUESTION_5 LAST FOUR WEEKS HOW WOULD YOU RATE YOUR ASTHMA CONTROL: POORLY CONTROLLED
ACT_TOTALSCORE: 8
QUESTION_4 LAST FOUR WEEKS HOW OFTEN HAVE YOU USED YOUR RESCUE INHALER OR NEBULIZER MEDICATION (SUCH AS ALBUTEROL): THREE OR MORE TIMES PER DAY

## 2025-08-26 ENCOUNTER — TELEPHONE (OUTPATIENT)
Dept: PULMONOLOGY | Facility: CLINIC | Age: 55
End: 2025-08-26
Payer: COMMERCIAL

## 2025-08-27 ENCOUNTER — PRE VISIT (OUTPATIENT)
Dept: PULMONOLOGY | Facility: CLINIC | Age: 55
End: 2025-08-27

## 2025-08-27 ENCOUNTER — OFFICE VISIT (OUTPATIENT)
Dept: PULMONOLOGY | Facility: CLINIC | Age: 55
End: 2025-08-27
Attending: INTERNAL MEDICINE
Payer: COMMERCIAL

## 2025-08-27 ENCOUNTER — ANCILLARY PROCEDURE (OUTPATIENT)
Dept: GENERAL RADIOLOGY | Facility: CLINIC | Age: 55
End: 2025-08-27
Payer: COMMERCIAL

## 2025-08-27 VITALS
WEIGHT: 189.3 LBS | HEART RATE: 59 BPM | OXYGEN SATURATION: 99 % | SYSTOLIC BLOOD PRESSURE: 110 MMHG | BODY MASS INDEX: 28.69 KG/M2 | DIASTOLIC BLOOD PRESSURE: 75 MMHG | HEIGHT: 68 IN

## 2025-08-27 DIAGNOSIS — J45.21 MILD INTERMITTENT ASTHMA WITH ACUTE EXACERBATION: ICD-10-CM

## 2025-08-27 DIAGNOSIS — J45.20 MILD INTERMITTENT ASTHMA, UNSPECIFIED WHETHER COMPLICATED: ICD-10-CM

## 2025-08-27 DIAGNOSIS — J45.40 MODERATE PERSISTENT ASTHMA WITHOUT COMPLICATION: Primary | ICD-10-CM

## 2025-08-27 LAB
DLCOCOR-%PRED-PRE: 97 %
DLCOCOR-PRE: 21.68 ML/MIN/MMHG
DLCOUNC-%PRED-PRE: 98 %
DLCOUNC-PRE: 21.95 ML/MIN/MMHG
DLCOUNC-PRED: 22.35 ML/MIN/MMHG
ERV-%PRED-PRE: 49 %
ERV-PRE: 0.55 L
ERV-PRED: 1.13 L
EXPTIME-PRE: 10.46 SEC
FEF2575-%PRED-POST: 94 %
FEF2575-%PRED-PRE: 62 %
FEF2575-POST: 2.49 L/SEC
FEF2575-PRE: 1.64 L/SEC
FEF2575-PRED: 2.62 L/SEC
FEFMAX-%PRED-PRE: 93 %
FEFMAX-PRE: 6.71 L/SEC
FEFMAX-PRED: 7.21 L/SEC
FEV1-%PRED-PRE: 93 %
FEV1-PRE: 2.74 L
FEV1FEV6-PRE: 72 %
FEV1FEV6-PRED: 82 %
FEV1FVC-PRE: 67 %
FEV1FVC-PRED: 80 %
FEV1SVC-PRE: 74 L
FEV1SVC-PRED: 71 L
FIFMAX-PRE: 5.45 L/SEC
FRCPLETH-%PRED-PRE: 80 %
FRCPLETH-PRE: 2.53 L
FRCPLETH-PRED: 3.14 L
FVC-%PRED-PRE: 111 %
FVC-PRE: 4.08 L
FVC-PRED: 3.67 L
GAW-PRED: 1.03 L/S/CMH2O
IC-%PRED-PRE: 108 %
IC-PRE: 3.1 L
IC-PRED: 2.87 L
Lab: 84 %
RVPLETH-%PRED-PRE: 105 %
RVPLETH-PRE: 1.93 L
RVPLETH-PRED: 1.83 L
SGAW-PRED: 0.2 1/CMH2O*S
SRAW-PRED: < 4.76 CMH2O*S
TLCPLETH-%PRED-PRE: 95 %
TLCPLETH-PRE: 5.63 L
TLCPLETH-PRED: 5.92 L
VA-%PRED-PRE: 105 %
VA-PRE: 5.74 L
VC-%PRED-PRE: 90 %
VC-PRE: 3.7 L
VC-PRED: 4.09 L

## 2025-08-27 PROCEDURE — 94060 EVALUATION OF WHEEZING: CPT | Performed by: INTERNAL MEDICINE

## 2025-08-27 PROCEDURE — 99204 OFFICE O/P NEW MOD 45 MIN: CPT | Performed by: INTERNAL MEDICINE

## 2025-08-27 PROCEDURE — 3074F SYST BP LT 130 MM HG: CPT | Performed by: INTERNAL MEDICINE

## 2025-08-27 PROCEDURE — 94729 DIFFUSING CAPACITY: CPT | Performed by: INTERNAL MEDICINE

## 2025-08-27 PROCEDURE — 94726 PLETHYSMOGRAPHY LUNG VOLUMES: CPT | Performed by: INTERNAL MEDICINE

## 2025-08-27 PROCEDURE — 3078F DIAST BP <80 MM HG: CPT | Performed by: INTERNAL MEDICINE

## 2025-08-27 PROCEDURE — 71046 X-RAY EXAM CHEST 2 VIEWS: CPT | Performed by: RADIOLOGY

## 2025-08-27 PROCEDURE — 1126F AMNT PAIN NOTED NONE PRSNT: CPT | Performed by: INTERNAL MEDICINE

## 2025-08-27 PROCEDURE — 99213 OFFICE O/P EST LOW 20 MIN: CPT | Performed by: INTERNAL MEDICINE

## 2025-08-27 RX ORDER — MONTELUKAST SODIUM 10 MG/1
10 TABLET ORAL AT BEDTIME
Qty: 90 TABLET | Refills: 5 | Status: SHIPPED | OUTPATIENT
Start: 2025-08-27

## 2025-08-27 RX ORDER — BUDESONIDE AND FORMOTEROL FUMARATE DIHYDRATE 160; 4.5 UG/1; UG/1
AEROSOL RESPIRATORY (INHALATION)
Qty: 20.4 G | Refills: 11 | Status: SHIPPED | OUTPATIENT
Start: 2025-08-27

## 2025-08-27 ASSESSMENT — ENCOUNTER SYMPTOMS
WHEEZING: 1
COUGH: 0
SHORTNESS OF BREATH: 1

## 2025-08-27 ASSESSMENT — PAIN SCALES - GENERAL: PAINLEVEL_OUTOF10: NO PAIN (0)

## (undated) RX ORDER — FENTANYL CITRATE 50 UG/ML
INJECTION, SOLUTION INTRAMUSCULAR; INTRAVENOUS
Status: DISPENSED
Start: 2023-08-14

## (undated) RX ORDER — DIPHENHYDRAMINE HYDROCHLORIDE 50 MG/ML
INJECTION INTRAMUSCULAR; INTRAVENOUS
Status: DISPENSED
Start: 2023-08-14